# Patient Record
Sex: FEMALE | Race: ASIAN
[De-identification: names, ages, dates, MRNs, and addresses within clinical notes are randomized per-mention and may not be internally consistent; named-entity substitution may affect disease eponyms.]

---

## 2022-04-24 ENCOUNTER — HOSPITAL ENCOUNTER (EMERGENCY)
Dept: HOSPITAL 74 - FER | Age: 42
Discharge: HOME | End: 2022-04-24
Payer: COMMERCIAL

## 2022-04-24 VITALS — HEART RATE: 62 BPM | TEMPERATURE: 97.8 F | DIASTOLIC BLOOD PRESSURE: 60 MMHG | SYSTOLIC BLOOD PRESSURE: 115 MMHG

## 2022-04-24 VITALS — BODY MASS INDEX: 21.5 KG/M2

## 2022-04-24 DIAGNOSIS — M62.838: Primary | ICD-10-CM

## 2022-04-24 PROCEDURE — 3E023GC INTRODUCTION OF OTHER THERAPEUTIC SUBSTANCE INTO MUSCLE, PERCUTANEOUS APPROACH: ICD-10-PCS

## 2023-02-08 ENCOUNTER — HOSPITAL ENCOUNTER (OUTPATIENT)
Dept: HOSPITAL 74 - FASU | Age: 43
Discharge: HOME | End: 2023-02-08
Attending: ORTHOPAEDIC SURGERY
Payer: COMMERCIAL

## 2023-02-08 VITALS — TEMPERATURE: 97.6 F | DIASTOLIC BLOOD PRESSURE: 61 MMHG | RESPIRATION RATE: 16 BRPM | SYSTOLIC BLOOD PRESSURE: 103 MMHG

## 2023-02-08 VITALS — HEART RATE: 70 BPM

## 2023-02-08 VITALS — BODY MASS INDEX: 21.4 KG/M2

## 2023-02-08 DIAGNOSIS — G56.02: Primary | ICD-10-CM

## 2023-02-08 PROCEDURE — 01N50ZZ RELEASE MEDIAN NERVE, OPEN APPROACH: ICD-10-PCS | Performed by: ORTHOPAEDIC SURGERY

## 2023-12-20 ENCOUNTER — HOSPITAL ENCOUNTER (OUTPATIENT)
Dept: HOSPITAL 74 - JMAMMO-SUR | Age: 43
Discharge: HOME | End: 2023-12-20
Attending: OBSTETRICS & GYNECOLOGY
Payer: COMMERCIAL

## 2023-12-20 DIAGNOSIS — C50.811: Primary | ICD-10-CM

## 2023-12-20 DIAGNOSIS — C77.3: ICD-10-CM

## 2023-12-20 PROCEDURE — 0H9T3ZX DRAINAGE OF RIGHT BREAST, PERCUTANEOUS APPROACH, DIAGNOSTIC: ICD-10-PCS

## 2023-12-20 PROCEDURE — A4648 IMPLANTABLE TISSUE MARKER: HCPCS

## 2023-12-26 PROBLEM — Z00.00 ENCOUNTER FOR PREVENTIVE HEALTH EXAMINATION: Status: ACTIVE | Noted: 2023-12-26

## 2023-12-27 ENCOUNTER — APPOINTMENT (OUTPATIENT)
Dept: HEMATOLOGY ONCOLOGY | Facility: CLINIC | Age: 43
End: 2023-12-27
Payer: COMMERCIAL

## 2023-12-27 ENCOUNTER — RESULT REVIEW (OUTPATIENT)
Age: 43
End: 2023-12-27

## 2023-12-27 VITALS
RESPIRATION RATE: 16 BRPM | OXYGEN SATURATION: 100 % | HEART RATE: 80 BPM | BODY MASS INDEX: 23.21 KG/M2 | SYSTOLIC BLOOD PRESSURE: 115 MMHG | HEIGHT: 63 IN | DIASTOLIC BLOOD PRESSURE: 64 MMHG | WEIGHT: 131 LBS | TEMPERATURE: 97.1 F

## 2023-12-27 DIAGNOSIS — Z83.3 FAMILY HISTORY OF DIABETES MELLITUS: ICD-10-CM

## 2023-12-27 DIAGNOSIS — Z82.49 FAMILY HISTORY OF ISCHEMIC HEART DISEASE AND OTHER DISEASES OF THE CIRCULATORY SYSTEM: ICD-10-CM

## 2023-12-27 DIAGNOSIS — Z78.9 OTHER SPECIFIED HEALTH STATUS: ICD-10-CM

## 2023-12-27 PROCEDURE — 99205 OFFICE O/P NEW HI 60 MIN: CPT | Mod: 25

## 2023-12-27 PROCEDURE — 36415 COLL VENOUS BLD VENIPUNCTURE: CPT

## 2023-12-27 PROCEDURE — 99417 PROLNG OP E/M EACH 15 MIN: CPT | Mod: 25

## 2023-12-27 RX ORDER — GABAPENTIN 100 MG/1
100 CAPSULE ORAL
Refills: 0 | Status: COMPLETED | COMMUNITY
End: 2023-12-27

## 2023-12-28 NOTE — HISTORY OF PRESENT ILLNESS
Refill is denied. Pt has not been seen in a year and will not receive refill until appt    ----- Message from Isha Frost sent at 12/28/2018 12:17 PM EST -----  SCRIPT NEEDS TO BE SENT TO [facility] IN [place]  PHONE [phone] FOR THE VITAMIN D    PATIENT WONT BE COMING BACK TO [place] TILL MARCH 5TH    PLEASE SEND SCRIPT TO [facility] IN [facility]     [de-identified] : Arely Luu is a 43 year old female being referred by Dr Schmitz for right breast cancer.   LMP 23 Last mammo 2023 Impression: 1. Bilateral 3-D Screening Mammogram Dense breast parenchyma limits evaluation Highly suspicious irregular mass with spiculation in the 12:00 position of the right breast mid depth with suspicious grouped calcifications noted in area, corresponding to an irregular solid mass highly suspicious mass or breast ultrasound. This correspond to the palpable abnormality that the patient reports. Recommend ultrasound=guided biopsy. BIRADS Category 5 - highly suspicious 2. BILATERAL BREAT ULTRASOUND Palpable right breast 12:00 position mass is seen as irregular taller than wide hypoechoic 1.9 x2.5vm solid mass with microcalcifiations within. Highly suspicious for malignancy. Recommend ultrasound-guided biopsy. Right breast 3:00 retroareolar 0.9x0.7x0.5cm mass. Recommend ultrasound guided biopsy Right breast 7:30 position 11cm from nipple 1.1 x1.2x0.6cm slightly lobulated mass. Recommend ultrasound guided biopsy Right axilla 1.8x1.1x1.9cm prominent lymph node demonstrating a thickened cortex. Recommend ultrasound guided biopsy BI-RADS Category 5- high suspicious, biopsy recommended  CNY; Never Contraception: Withdrawal method Hx Abnormal Pap: denies  G5 3 SABx2,  x3

## 2023-12-28 NOTE — PHYSICAL EXAM
[Fully active, able to carry on all pre-disease performance without restriction] : Status 0 - Fully active, able to carry on all pre-disease performance without restriction [Normal] : affect appropriate [de-identified] : ~4 cm x 3.5 cm firm mobile palpable mass, right breast 12:00 position. Palpable right axillary node around the biopsy site. [de-identified] : Mild pain around the prior spine surgery site.

## 2023-12-28 NOTE — ASSESSMENT
[FreeTextEntry1] : Right breast  IDC Grade 3, 2.9 cm per USG ER (95%) positive, LA (5%) positive, Tfq5iff negative by IHC KI 67- 60% First felt end of 2023. Unchanged No new bone pains, weight loss Age at Menarche: 11 years Age at Menopause: LMP 22 Age at first pregnancy: 25 years Total number of pregnancies: 3 children.  Breast feedinm, 12m, 12m OC pills Never Invitae genetic testing done at [facility], Results pending  Records from [facility] including imaging studies, pathology, procedure/OR notes reviewed analyzed and discussed Discussed at length about the diagnosis, work up, staging, prognosis and treatment options Discussed about the role of local therapy (surgery and radiation) and systemic therapy (chemotherapy, endocrine therapy, Xxq5xeh directed therapy) SHe has a poorly diff ER positive nad Her2 negative disease with high KI67 60% We need staging work up for which I recommend PET/CT. We also need to wait for the results of MRI which was done yesterday, results pending Depending on the MRI and PET results, as long as she does not have metastatic disease, she will get surgery, endocrine therapy nad possibly chemotherapy She has appt with Dr Schmitz (Breast surgery) next week. If it will benefit from surgery stand point, we may consider neoadjuvant chemotherapy. Otherwise, we can look at the final surgical path and send genomic assay such as Oncotype or Mammaprint if she has 1-3 positive LNs to determine chemotherapy We can also consider sending genomic assay from the biopsy once we have the specimen retrieved from [facility] Once we have all the imaging, her treatment plan will be coordinated between Dr Schmitz and myself Plan: Send blood work including CBC, CMP, [id number], FSH, Estradiol, Pola CTDNA PET/CT for staging MRI breast- Obtain results. CD sent to Radiology for scanning Obtain pathology slides from [facility] Enedelia discussed about lifestyle changes including exercise and whole food plant based diet  Social hx:  Lives in [place], [place] with   (Urbano) Worked [employer] Originally from [place].  Never smoker  Patient,  (Urbano) and sister in law (Talia) had multiple questions which were answered to satisfaction  They have scheduled a couple of second opinions and will decide subsequently where they want to pursue care  Follow up will be coordinated after she is done seeing Dr Schmitz Family Hx F GM: breast cancer

## 2024-01-03 ENCOUNTER — NON-APPOINTMENT (OUTPATIENT)
Age: 44
End: 2024-01-03

## 2024-01-03 ENCOUNTER — APPOINTMENT (OUTPATIENT)
Dept: BREAST CENTER | Facility: CLINIC | Age: 44
End: 2024-01-03
Payer: COMMERCIAL

## 2024-01-03 VITALS
HEIGHT: 63 IN | BODY MASS INDEX: 23.57 KG/M2 | DIASTOLIC BLOOD PRESSURE: 57 MMHG | SYSTOLIC BLOOD PRESSURE: 114 MMHG | HEART RATE: 76 BPM | WEIGHT: 133 LBS

## 2024-01-03 DIAGNOSIS — Z86.69 PERSONAL HISTORY OF OTHER DISEASES OF THE NERVOUS SYSTEM AND SENSE ORGANS: ICD-10-CM

## 2024-01-03 DIAGNOSIS — Z87.39 PERSONAL HISTORY OF OTHER DISEASES OF THE MUSCULOSKELETAL SYSTEM AND CONNECTIVE TISSUE: ICD-10-CM

## 2024-01-03 PROCEDURE — 99205 OFFICE O/P NEW HI 60 MIN: CPT | Mod: 25

## 2024-01-03 PROCEDURE — 93702 BIS XTRACELL FLUID ANALYSIS: CPT | Mod: NC

## 2024-01-03 NOTE — PHYSICAL EXAM
[Normocephalic] : normocephalic [Atraumatic] : atraumatic [Supple] : supple [No Supraclavicular Adenopathy] : no supraclavicular adenopathy [Examined in the supine and seated position] : examined in the supine and seated position [Symmetrical] : symmetrical [No dominant masses] : no dominant masses left breast [No Nipple Retraction] : no left nipple retraction [No Nipple Discharge] : no left nipple discharge [No Axillary Lymphadenopathy] : no left axillary lymphadenopathy [No Edema] : no edema [No Rashes] : no rashes [No Ulceration] : no ulceration [de-identified] : s/p wise pattern bilaterally, NAC viable [de-identified] : 12:00 about 4cm mass underlying NAC, very superficial [de-identified] : palp node, c/w known mets

## 2024-01-03 NOTE — CONSULT LETTER
[Dear  ___] : Dear  [unfilled], [( Thank you for referring [unfilled] for consultation for _____ )] : Thank you for referring [unfilled] for consultation for [unfilled] [Please see my note below.] : Please see my note below. [Consult Closing:] : Thank you very much for allowing me to participate in the care of this patient.  If you have any questions, please do not hesitate to contact me. [Sincerely,] : Sincerely, [FreeTextEntry3] : Jelena Schmitz [place] [address] [DrPam  ___] : Dr. CARDENAS

## 2024-01-03 NOTE — HISTORY OF PRESENT ILLNESS
[FreeTextEntry1] : Arely is a 43 year old female referred by Dr. 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  She noted a painful right breast mass while she was out of the country in [facility]. At that time an ultrasound was ordered and revealed a 2.6 x 3.6 cm right breast mass. Upon return she consulted with her GYN Dr Tam (12/5/23) at which time diagnostic imaging was ordered.  Bilateral diagnostic mammogram (12/14/2023, [facility]) showed extremely dense breast tissue and a right 12:00 asymmetry and distortion in area of palpable concern.  Ultrasound findings showed a right 12:00 2.7 x 2.9 cm irregular hypoechoic lesion with internal calcifications correlating to mammogram findings. Additional ultrasound findings are a right 3:00 reto 0.9 x 0.7 x 0.5 cm irregular hypoechoic lesion and a right 7:30 N11 1.1 x 1.2 x 0.6 cm lobulated hypodense mass. Ultrasound biopsy of all three breast masses were recommended. A prominent right axillary lymph node measuring 1.8 1.1 x 1.9 cm with a thickened cortex suspicious for metastatic disease was also seen and ultrasound biopsy recommended. No suspicious findings on mammogram of the left breast was noted. Ultrasound was unilateral.  Arely underwent the recommended biopsies on 12/20/2023 ([facility]) Pathology findings: Right 12:00  (Coil-shaped clip)IDC, poorly differentiated, ER strongly positive in 95%, VT moderately positive in 5%, Her2 negative, and Ki67 60% Right axilla lymph node (hydro-marking clip) containing foci of metastatic carcinoma (receptors not repeated on lymph node tissue) Right 3:00 9ribbon-shaped clip) fibroadenoma Right 7:30 (wing-shaped clip)fibroadenoma  She again consulted with Dr Tam after pathology was available and a bilateral breast MRI was ordered as well as an [employer] genetics panel.   She presents with her  and sister-in-law for consultation. She has been in the [place] 2 [place] and used to work for the [employer]. She had two breast reductions in [facility] one in 2001 and the second in 2017.  She does SBE.  She has not noticed a change in her breast or a breast lump on left. On right mass has gotten bigger since biopsy. She has not noticed a change in her nipple or nipple area. She has not noticed a change in the skin of the breast. except post bx changes on right She is not experiencing nipple discharge. She is experiencing breast pain. Bilateral  She has noticed a lump or lymph node under the armpit on the right only.  BREAST CANCER RISK FACTORS Menarche: 11 Date of LMP: 12/19/2023 Menopause: pre Grav: 5     Para:  3 (17, 13, 1 yo sons) Age at first live birth: 25 Nursed: yes Hysterectomy: no Oophorectomy: no  OCP: no HRT: no  Last pap/pelvic exam: 12/2023 WNL Related family history: pat GM BCA@ 50 Ashkenazi: no Mastery risk assessment: n/a BRCA testing: pending Invitae sent 12/27/2023 (Dr. Tam) Bra size:  34 DD  Last mammogram: 12/14 2023                              Location: [facility] Report reviewed.                                 Images reviewed. Results: Birads 5 Extremely dense breast tissue. Right 12:00 asymmetry and distortion in area of palpable concern.    Last ultrasound: 12/14/2023                                  Location: [facility] Report reviewed.                                 Images reviewed.  Results: Birads 5 Right 12:00 2.7 x 2.9 cm irregular hypoechoic lesion with internal calcifications correlating to mammogram findings.  Right 3:00 retro 0.9 x 0.7 x 0.5 cm irregular hypoechoic lesion. Right 7:30 N11 1.1 x 1.2 x 0.6 cm lobulated hypodense mass.  Right axilla prominent right axillary lymph node measuring 1.8 1.1 x 1.9 cm with a thickened cortex suspicious for metastatic disease. Rec: Ultrasound biopsy of all four findings.  Last MRI:  12/26/2023                                           Location:  [facility] Report reviewed. Results: Report pending

## 2024-01-03 NOTE — REVIEW OF SYSTEMS
[Feeling Tired] : feeling tired [Eyesight Problems] : eyesight problems [Sore Throat] : sore throat [Arthralgias] : arthralgias [Hand Pain] : hand pain [Hand Stiffness] : stiffness of the hand [Lower Back Pain] : lower back pain [Upper Back Pain] : upper back pain [Breast Pain] : breast pain [Breast Lump] : breast lump [Breast Swelling] : breast swelling [Breast Warmth] : breast warmth [Breast Itching] : breast itching [Negative] : Heme/Lymph

## 2024-01-03 NOTE — ASSESSMENT
[FreeTextEntry1] : The pathology and imaging results were reviewed and discussed. She is a stage IIB right breast cancer (T2N1M0) HER2 negative ER+/SC+ (AJCC 8thed).  Breast MRI report was reviewed.  The use of multimodality therapy for the treatment of breast cancer was discussed.   Surgical options were reviewed including a lumpectomy to negative margins, with whole breast radiation therapy versus a mastectomy with or without reconstruction. Included in this discussion with the results of the NSABP-04 and 06 trials.  Mastectomy techniques were discussed in detail including skin sparing and nipple sparing techniques. Recurrence was reviewed and that mastectomy does not confer a 100% risk reduction nor guarantee against breast cancer in the future.  Reconstructive options were briefly reviewed, including implant based versus tissue flap based reconstruction options.  Referral to a plastic surgeon was offered.  The patient was informed that breast reconstruction is covered by insurance per state and federal laws.     Axillary staging was discussed. We reviewed the sentinel lymph node procedure, and how if the sentinel lymph node is found to have metastatic disease either on the intraoperative evaluation or on the final pathology, that an axillary dissection of the remaining lymph nodes may be recommended. It was explained that an axillary dissection takes "level one and level two" lymph nodes, and also "level three" if they feel clinically suspicious. It was explained that if the sentinel lymph node was not able to be found, that an axillary dissection may be necessary.  I reviewed the risks of lymphedema for SLND (6%) versus ALND (20%). I reviewed our lymphedema monitoring program. Baseline SOZO was completed.  The Z-0011 study was touched upon, outlining that there is evidence that it may not be necessary to complete an axillary dissection in select patients even if one or two sentinel nodes are positive for cancer, as long as the cancer in the nodes is confined to within the nodes, the nodes aren't  matted down, and if the cancer meets certain criteria including being less than 5 cm, treated by lumpectomy and conventional whole breast radiation, and the patient is planning to take recommended adjuvant therapy, and didn't require preoperative chemotherapy.     The general indications for the use of adjuvant hormonal and chemotherapy and targeted therapies were discussed. It was explained that medical treatments are dependent on pathology results including receptor studies.  The patient was advised to meet with a medical oncologist since she is node positive. It was explained that some patients have further genetic testing of their tumors before a decision about chemotherapy can be made.  The indications for radiation therapy and side effects were also discussed including the use varying lengths of whole breast radiation.  It was explained that radiation is generally reserved for lumpectomy patients, and patients with larger tumors or positive lymph nodes. Common side effects were reviewed. She is not a candidate for intraoperative radiation therapy.  The genetics of breast cancer were discussed with the implications for risk of contralateral cancer and other associated cancers, implication for ovarian risk, options for management, and implications for family members. She is awaiting genetic testing results.  She met with our cancer navigator and was given a cancer binder.   I believe I was able to answer all of her questions to her satisfaction. She is scheduled for her PET scan tomorrow. Discussed with Dr. Judd that neoadjuvant chemotherapy is being recommended. I think this is reasonable so that she can start her systemic therapy asap. We discussed mastectomy and post mastectomy radiation. She is strongly motivated towards autologous reconstruction.

## 2024-01-04 ENCOUNTER — RESULT REVIEW (OUTPATIENT)
Age: 44
End: 2024-01-04

## 2024-01-05 ENCOUNTER — APPOINTMENT (OUTPATIENT)
Dept: HEMATOLOGY ONCOLOGY | Facility: CLINIC | Age: 44
End: 2024-01-05
Payer: COMMERCIAL

## 2024-01-05 VITALS
HEIGHT: 63 IN | WEIGHT: 131.5 LBS | BODY MASS INDEX: 23.3 KG/M2 | DIASTOLIC BLOOD PRESSURE: 67 MMHG | RESPIRATION RATE: 16 BRPM | HEART RATE: 82 BPM | OXYGEN SATURATION: 100 % | TEMPERATURE: 98 F | SYSTOLIC BLOOD PRESSURE: 112 MMHG

## 2024-01-05 DIAGNOSIS — Z71.3 DIETARY COUNSELING AND SURVEILLANCE: ICD-10-CM

## 2024-01-05 PROCEDURE — 99417 PROLNG OP E/M EACH 15 MIN: CPT

## 2024-01-05 PROCEDURE — 99215 OFFICE O/P EST HI 40 MIN: CPT

## 2024-01-08 ENCOUNTER — RESULT REVIEW (OUTPATIENT)
Age: 44
End: 2024-01-08

## 2024-01-08 NOTE — HISTORY OF PRESENT ILLNESS
[de-identified] : Arely Luu is a 43 year old female being referred by Dr Schmitz for right breast cancer.   LMP 23 Last mammo 2023 Impression: 1. Bilateral 3-D Screening Mammogram Dense breast parenchyma limits evaluation Highly suspicious irregular mass with spiculation in the 12:00 position of the right breast mid depth with suspicious grouped calcifications noted in area, corresponding to an irregular solid mass highly suspicious mass or breast ultrasound. This correspond to the palpable abnormality that the patient reports. Recommend ultrasound=guided biopsy. BIRADS Category 5 - highly suspicious 2. BILATERAL BREAT ULTRASOUND Palpable right breast 12:00 position mass is seen as irregular taller than wide hypoechoic 1.9 x2.5vm solid mass with microcalcifiations within. Highly suspicious for malignancy. Recommend ultrasound-guided biopsy. Right breast 3:00 retroareolar 0.9x0.7x0.5cm mass. Recommend ultrasound guided biopsy Right breast 7:30 position 11cm from nipple 1.1 x1.2x0.6cm slightly lobulated mass. Recommend ultrasound guided biopsy Right axilla 1.8x1.1x1.9cm prominent lymph node demonstrating a thickened cortex. Recommend ultrasound guided biopsy BI-RADS Category 5- high suspicious, biopsy recommended  CNY; Never Contraception: Withdrawal method Hx Abnormal Pap: denies  G5 3 SABx2,  x3   No new bone pains, weight loss Age at Menarche: 11 years Age at Menopause: LMP 22 Age at first pregnancy: 25 years Total number of pregnancies: 3 children.  Breast feedinm, 12m, 12m OC pills Never  [de-identified] : Patient is seen today for follow up Accompanied by her  (Urbano)  No new symptoms Saw Dr Jelena Schmitz (Breast surgery) and had PET/CT yesterday (1/4/24)  PET/CT (1/4/23) Reviewed and discussed with radiology (Dr Riky Spencer) She has FDG avid right breast mass with multiple lymph node involvement including right axillary, infrapectoral and right supraclavicular node. No distant metastases

## 2024-01-08 NOTE — PHYSICAL EXAM
[Fully active, able to carry on all pre-disease performance without restriction] : Status 0 - Fully active, able to carry on all pre-disease performance without restriction [Normal] : affect appropriate [de-identified] : ~4 cm x 3.5 cm firm mobile palpable mass, right breast 12:00 position. Palpable right axillary node around the biopsy site. [de-identified] : Mild pain around the prior spine surgery site.

## 2024-01-08 NOTE — ASSESSMENT
[FreeTextEntry1] : Right breast  IDC Grade 3, 2.9 cm per USG ER (95%) positive, GA (5%) positive, Ebz8iqp negative by IHC KI 67- 60% First felt end of July 2023. Unchanged Invitae genetic testing done at [facility], Results pending MRI (12/26/23) at [facility]-: 4.8 cm x 3.1 cm right breast 12:00 mass, extension into medial half of right breast. Right axillary LN marking clip. Left breast normal PET/CT (1/4/23): FDG avid right breast mass with multiple lymph node involvement including right axillary, infrapectoral and right supraclavicular node. No distant metastases Clinical pathological stage IIIB ([id number]) if the supraclavicular node is positive for mets  Discussed at length about the diagnosis, work up, staging, prognosis and treatment options Discussed about the role of systemic chemotherapy, endocrine therapy, surgery, radiation Given the extensive disease, she is not a good candidate for upfront surgery. Discussed with Dr Schmitz.  She will benefit from neoadjvuant chemotherapy which will also work via ovarian suppression. She and her  are not interested in fertility preservation I have reviewed the risks, benefits and side effects of chemotherapy with the patient. Options of ddAC->T vs TC discussed. Explained about nausea, vomiting, diarrhea/constipation, hair loss, fatigue, mouth sores, infections, bleeding, kidney/liver damage, neuropathy, etc. Also discussed about the risk of cardiac toxicity (arrhythmia, heart failure) and leukemia with adriamycin. All questions were answered to satisfaction. Patient agrees to pursue the planned chemotherapy. She opts for AC->T  Discussed about cold caps. She is leaning against it and will call me if she changes her mind Plan: Adriamycin 60 mg/m2 + Cyclophosphamide 600 mg/m2 every 2 weeks with neulasta x 4 followed by paclitaxel 80 mg/m2 weekly x 12 We have requested ER, GA, Umq2vxf to be repeated on the LNs at [facility]. Follow results  Obtain pathology slides from [facility] Echocardiogram Chemoport placement Biopsy Right supraclavicular LN to confirm involvement Follow Pola  Lifestyle modification Discussed at length about the diet especially trying whole food plant-based diet for 1 to 3 months. Patient can continue longer if desired. Available evidence about the benefits reviewed and discussed with the patient.  Resources in the form of books, websites, documentaries given to the patient in writing. Advised to take B12 1000 mcg QD if she chooses to switch to plant based diet. Patient is motivated to try the diet.  Social hx:  Lives in [place], [place] with   (Urbano) Worked [employer] Originally from [place].  Never smoker  Family Hx F GM: breast cancer Invitae sent at [facility]. Follow results  Patient,  (Urbano) had multiple questions which were answered to satisfaction  Follow up in 1 week for chemo Can use first set of labs

## 2024-01-09 ENCOUNTER — RESULT REVIEW (OUTPATIENT)
Age: 44
End: 2024-01-09

## 2024-01-09 ENCOUNTER — NON-APPOINTMENT (OUTPATIENT)
Age: 44
End: 2024-01-09

## 2024-01-09 DIAGNOSIS — J02.9 ACUTE PHARYNGITIS, UNSPECIFIED: ICD-10-CM

## 2024-01-11 ENCOUNTER — NON-APPOINTMENT (OUTPATIENT)
Age: 44
End: 2024-01-11

## 2024-01-12 ENCOUNTER — APPOINTMENT (OUTPATIENT)
Dept: HEMATOLOGY ONCOLOGY | Facility: CLINIC | Age: 44
End: 2024-01-12
Payer: COMMERCIAL

## 2024-01-12 VITALS
RESPIRATION RATE: 16 BRPM | SYSTOLIC BLOOD PRESSURE: 108 MMHG | OXYGEN SATURATION: 99 % | HEART RATE: 101 BPM | WEIGHT: 132.06 LBS | BODY MASS INDEX: 23.4 KG/M2 | TEMPERATURE: 98.4 F | DIASTOLIC BLOOD PRESSURE: 62 MMHG | HEIGHT: 63 IN

## 2024-01-12 DIAGNOSIS — Z95.828 PRESENCE OF OTHER VASCULAR IMPLANTS AND GRAFTS: ICD-10-CM

## 2024-01-12 PROCEDURE — 99215 OFFICE O/P EST HI 40 MIN: CPT

## 2024-01-12 RX ORDER — ONDANSETRON 4 MG/1
4 TABLET ORAL
Qty: 30 | Refills: 6 | Status: ACTIVE | COMMUNITY
Start: 2024-01-12 | End: 1900-01-01

## 2024-01-12 NOTE — PHYSICAL EXAM
[Fully active, able to carry on all pre-disease performance without restriction] : Status 0 - Fully active, able to carry on all pre-disease performance without restriction [Normal] : affect appropriate [de-identified] : ~4 cm x 3.5 cm firm mobile palpable mass, right breast 12:00 position. Palpable right axillary node around the biopsy site. [de-identified] : Mild pain around the prior spine surgery site.

## 2024-01-12 NOTE — ASSESSMENT
[FreeTextEntry1] : Right breast  IDC Grade 3, 2.9 cm per USG ER (95%) positive, MI (5%) positive, Eto0zyt negative by IHC, KI 67- 60% Right Supraclavicular LN: Psz8qfv 3+ Right axillary node: Bzh0bgf positive by FISH First felt end of July 2023. Unchanged Invitae genetic testing done at [facility], Results pending MRI (12/26/23) at [facility]-: 4.8 cm x 3.1 cm right breast 12:00 mass, extension into medial half of right breast. Right axillary LN marking clip. Left breast normal PET/CT (1/4/23): FDG avid right breast mass with multiple lymph node involvement including right axillary, infrapectoral and right supraclavicular node. No distant metastases Clinical pathological stage IIIB ([id number]) if the supraclavicular node is positive for mets Echo LVEF 62%  She is seen today for follow-up and begin cycle 1 ddAC s/p biopsy of right supraclavicular lymph node 1/9/2024 Path: Metastatic carcinoma consistent with breast primary ER>95% strong positive. MI up to 10% moderate to strong positive. Her2: Positive (Score: 3+). Discussed at length about tumor heterogeneity, and that her lymph node is actually HER2 positive.Implications discussed Will add HP to weekly Taxol.  I have reviewed the risks, benefits and side effects of Herceptin and perjeta including diarrhea, rash, hand foot syndrome, cardiac toxicity etc All questions were answered to satisfaction. Patient agrees to pursue the planned chemotherapy. Plan: Adriamycin 60 mg/m2 + Cyclophosphamide 600 mg/m2 every 2 weeks with neulasta x 4 followed by paclitaxel 80 mg/m2 weekly x 12 along with herceptin weekly (4mg/kg loading followed by 2mg/kg weekly) and perjeta (840 mg loading followed by 420 mg weekly) Q3W MRI brain Follow Pola  Iron deficiency Anemia Discussed at length about iron deficiency- etiology, signs and symptoms, complications, management options DIscussed about oral vs IV Iron I have reviewed the risks, benefits and side effects of IV iron with the patient. All questions were answered to satisfaction. Patient agrees to pursue IV iron. Schedule IV venofer 200 mg  x 5. Can given it with her chemo treatments  Social hx:  Lives in [place], [place] with   (Urbano) Worked [employer] Originally from [place].  Never smoker  Family Hx F GM: breast cancer Invitae sent at [facility]. Follow results  Patient,  (Urbano) had multiple questions which were answered to satisfaction  Follow up in 1 week for chemo Can use first set of labs

## 2024-01-12 NOTE — HISTORY OF PRESENT ILLNESS
[de-identified] : Arely Luu is a 43 year old female being referred by Dr Schmitz for right breast cancer.   LMP 23 Last mammo 2023 Impression: 1. Bilateral 3-D Screening Mammogram Dense breast parenchyma limits evaluation Highly suspicious irregular mass with spiculation in the 12:00 position of the right breast mid depth with suspicious grouped calcifications noted in area, corresponding to an irregular solid mass highly suspicious mass or breast ultrasound. This correspond to the palpable abnormality that the patient reports. Recommend ultrasound=guided biopsy. BIRADS Category 5 - highly suspicious 2. BILATERAL BREAT ULTRASOUND Palpable right breast 12:00 position mass is seen as irregular taller than wide hypoechoic 1.9 x2.5vm solid mass with microcalcifiations within. Highly suspicious for malignancy. Recommend ultrasound-guided biopsy. Right breast 3:00 retroareolar 0.9x0.7x0.5cm mass. Recommend ultrasound guided biopsy Right breast 7:30 position 11cm from nipple 1.1 x1.2x0.6cm slightly lobulated mass. Recommend ultrasound guided biopsy Right axilla 1.8x1.1x1.9cm prominent lymph node demonstrating a thickened cortex. Recommend ultrasound guided biopsy BI-RADS Category 5- high suspicious, biopsy recommended  CNY; Never Contraception: Withdrawal method Hx Abnormal Pap: denies  G5 3 SABx2,  x3   No new bone pains, weight loss Age at Menarche: 11 years Age at Menopause: LMP 22 Age at first pregnancy: 25 years Total number of pregnancies: 3 children.  Breast feedinm, 12m, 12m OC pills Never  Saw Dr Jelena Schmitz (Breast surgery) and had PET/CT (24)  PET/CT (23) Reviewed and discussed with radiology (Dr Riky Spencer) She has FDG avid right breast mass with multiple lymph node involvement including right axillary, infrapectoral and right supraclavicular node. No distant metastases  [de-identified] : Patient is seen today for follow up and begin C1 ddAC (1/12/24) Accompanied by her  (Urbano)  No new symptoms She is status post Chemo-Port placement, echocardiogram s/p biopsy of right supraclavicular lymph node 1/9/2024 Path: Metastatic carcinoma consistent with breast primary ER>95% strong positive. [place] up to 10% moderate to strong positive. Her2: Positive (Score: 3+).

## 2024-01-15 ENCOUNTER — RESULT REVIEW (OUTPATIENT)
Age: 44
End: 2024-01-15

## 2024-01-19 ENCOUNTER — APPOINTMENT (OUTPATIENT)
Dept: HEMATOLOGY ONCOLOGY | Facility: CLINIC | Age: 44
End: 2024-01-19
Payer: COMMERCIAL

## 2024-01-19 ENCOUNTER — RESULT REVIEW (OUTPATIENT)
Age: 44
End: 2024-01-19

## 2024-01-19 VITALS
OXYGEN SATURATION: 99 % | BODY MASS INDEX: 22.68 KG/M2 | SYSTOLIC BLOOD PRESSURE: 105 MMHG | DIASTOLIC BLOOD PRESSURE: 57 MMHG | WEIGHT: 128 LBS | HEART RATE: 100 BPM | TEMPERATURE: 99.5 F | HEIGHT: 63 IN | RESPIRATION RATE: 16 BRPM

## 2024-01-19 DIAGNOSIS — K21.9 GASTRO-ESOPHAGEAL REFLUX DISEASE W/OUT ESOPHAGITIS: ICD-10-CM

## 2024-01-19 PROCEDURE — 36415 COLL VENOUS BLD VENIPUNCTURE: CPT

## 2024-01-19 PROCEDURE — 99215 OFFICE O/P EST HI 40 MIN: CPT

## 2024-01-19 NOTE — ASSESSMENT
[FreeTextEntry1] : Right breast  IDC Grade 3, 2.9 cm per USG ER (95%) positive, MA (5%) positive, Cdb1bhv negative by IHC, KI 67- 60% Right Supraclavicular LN: Mvv1dkt 3+ Right axillary node: Ptw2myx positive by FISH First felt end of July 2023. Unchanged [facility] genetic testing done at [facility], Results pending MRI (12/26/23) at [facility]-: 4.8 cm x 3.1 cm right breast 12:00 mass, extension into medial half of right breast. Right axillary LN marking clip. Left breast normal PET/CT (1/4/23): FDG avid right breast mass with multiple lymph node involvement including right axillary, infrapectoral and right supraclavicular node. No distant metastases Clinical pathological stage IIIB ([id number]) if the supraclavicular node is positive for mets Echo LVEF 62% s/p biopsy of right supraclavicular lymph node 1/9/2024 Path: Metastatic carcinoma consistent with breast primary ER>95% strong positive. MA up to 10% moderate to strong positive. Her2: Positive (Score: 3+).  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 Monitor for fever and go to ER PRN Obtain MRI brain Will give Olanzapine with C2 and increase dex to 3 days with next chemo Take pantaprozole with dex  Plan: Adriamycin 60 mg/m2 + Cyclophosphamide 600 mg/m2 every 2 weeks with neulasta x 4 followed by paclitaxel 80 mg/m2 weekly x 12 along with herceptin weekly (4mg/kg loading followed by 2mg/kg weekly) and perjeta (840 mg loading followed by 420 mg weekly) Q3W MRI brain Follow [facility]  Iron deficiency Anemia IV venofer 200 mg  x 5 with her chemo treatments  Social hx:  Lives in [place], [place] with   (Urbano) Worked [employer] Originally from [place].  Never smoker  Family Hx F GM: breast cancer [facility] sent at [facility]. Follow results  Patient,  (Urbano) had multiple questions which were answered to satisfaction  Follow up in 1 week C2 CBC, CMP

## 2024-01-19 NOTE — HISTORY OF PRESENT ILLNESS
[de-identified] : Arely Luu is a 43 year old female being referred by Dr Schmitz for right breast cancer.   LMP 23 Last mammo 2023 Impression: 1. Bilateral 3-D Screening Mammogram Dense breast parenchyma limits evaluation Highly suspicious irregular mass with spiculation in the 12:00 position of the right breast mid depth with suspicious grouped calcifications noted in area, corresponding to an irregular solid mass highly suspicious mass or breast ultrasound. This correspond to the palpable abnormality that the patient reports. Recommend ultrasound=guided biopsy. BIRADS Category 5 - highly suspicious 2. BILATERAL BREAT ULTRASOUND Palpable right breast 12:00 position mass is seen as irregular taller than wide hypoechoic 1.9 x2.5vm solid mass with microcalcifiations within. Highly suspicious for malignancy. Recommend ultrasound-guided biopsy. Right breast 3:00 retroareolar 0.9x0.7x0.5cm mass. Recommend ultrasound guided biopsy Right breast 7:30 position 11cm from nipple 1.1 x1.2x0.6cm slightly lobulated mass. Recommend ultrasound guided biopsy Right axilla 1.8x1.1x1.9cm prominent lymph node demonstrating a thickened cortex. Recommend ultrasound guided biopsy BI-RADS Category 5- high suspicious, biopsy recommended  CNY; Never Contraception: Withdrawal method Hx Abnormal Pap: denies  G5 3 SABx2,  x3   No new bone pains, weight loss Age at Menarche: 11 years Age at Menopause: LMP 22 Age at first pregnancy: 25 years Total number of pregnancies: 3 children.  Breast feedinm, 12m, 12m OC pills Never  Saw Dr Jelena Schmitz (Breast surgery) and had PET/CT (24)  PET/CT (23) Reviewed and discussed with radiology (Dr Riky Spencer) She has FDG avid right breast mass with multiple lymph node involvement including right axillary, infrapectoral and right supraclavicular node. No distant metastases  She is status post Chemo-Port placement, echocardiogram s/p biopsy of right supraclavicular lymph node 2024 Path: Metastatic carcinoma consistent with breast primary ER>95% strong positive. [place] up to 10% moderate to strong positive. Her2: Positive (Score: 3+). [de-identified] : Patient is seen today for jean claude labs after C1 ddAC (1/12/24) Accompanied by her  (Urbano)  Had difficult time with C1.  Acid reflux No appetite Weakness Also One of her sons was sick with flu

## 2024-01-19 NOTE — PHYSICAL EXAM
[Fully active, able to carry on all pre-disease performance without restriction] : Status 0 - Fully active, able to carry on all pre-disease performance without restriction [Normal] : affect appropriate [de-identified] : ~4 cm x 3.5 cm firm mobile palpable mass, right breast 12:00 position is now softer with faint margins.. Palpable right axillary node around the biopsy site. [de-identified] : Mild pain around the prior spine surgery site.

## 2024-01-20 ENCOUNTER — NON-APPOINTMENT (OUTPATIENT)
Age: 44
End: 2024-01-20

## 2024-01-25 ENCOUNTER — RESULT REVIEW (OUTPATIENT)
Age: 44
End: 2024-01-25

## 2024-01-26 ENCOUNTER — RESULT REVIEW (OUTPATIENT)
Age: 44
End: 2024-01-26

## 2024-01-26 ENCOUNTER — APPOINTMENT (OUTPATIENT)
Dept: HEMATOLOGY ONCOLOGY | Facility: CLINIC | Age: 44
End: 2024-01-26
Payer: COMMERCIAL

## 2024-01-26 VITALS
OXYGEN SATURATION: 100 % | SYSTOLIC BLOOD PRESSURE: 103 MMHG | TEMPERATURE: 96.9 F | DIASTOLIC BLOOD PRESSURE: 67 MMHG | RESPIRATION RATE: 17 BRPM | HEIGHT: 63 IN | HEART RATE: 91 BPM | BODY MASS INDEX: 22.4 KG/M2 | WEIGHT: 126.44 LBS

## 2024-01-26 PROCEDURE — 99214 OFFICE O/P EST MOD 30 MIN: CPT | Mod: 25

## 2024-01-26 PROCEDURE — 36415 COLL VENOUS BLD VENIPUNCTURE: CPT

## 2024-01-26 NOTE — HISTORY OF PRESENT ILLNESS
[de-identified] : Arely Luu is a 43 year old female being referred by Dr Schmitz for right breast cancer.   LMP 23 Last mammo 2023 Impression: 1. Bilateral 3-D Screening Mammogram Dense breast parenchyma limits evaluation Highly suspicious irregular mass with spiculation in the 12:00 position of the right breast mid depth with suspicious grouped calcifications noted in area, corresponding to an irregular solid mass highly suspicious mass or breast ultrasound. This correspond to the palpable abnormality that the patient reports. Recommend ultrasound=guided biopsy. BIRADS Category 5 - highly suspicious 2. BILATERAL BREAT ULTRASOUND Palpable right breast 12:00 position mass is seen as irregular taller than wide hypoechoic 1.9 x2.5vm solid mass with microcalcifiations within. Highly suspicious for malignancy. Recommend ultrasound-guided biopsy. Right breast 3:00 retroareolar 0.9x0.7x0.5cm mass. Recommend ultrasound guided biopsy Right breast 7:30 position 11cm from nipple 1.1 x1.2x0.6cm slightly lobulated mass. Recommend ultrasound guided biopsy Right axilla 1.8x1.1x1.9cm prominent lymph node demonstrating a thickened cortex. Recommend ultrasound guided biopsy BI-RADS Category 5- high suspicious, biopsy recommended  CNY; Never Contraception: Withdrawal method Hx Abnormal Pap: denies  G5 3 SABx2,  x3   No new bone pains, weight loss Age at Menarche: 11 years Age at Menopause: LMP 22 Age at first pregnancy: 25 years Total number of pregnancies: 3 children.  Breast feedinm, 12m, 12m OC pills Never  Saw Dr Jelena Schmitz (Breast surgery) and had PET/CT (24)  PET/CT (23) Reviewed and discussed with radiology (Dr Riky Spencer) She has FDG avid right breast mass with multiple lymph node involvement including right axillary, infrapectoral and right supraclavicular node. No distant metastases  She is status post Chemo-Port placement, echocardiogram s/p biopsy of right supraclavicular lymph node 2024 Path: Metastatic carcinoma consistent with breast primary ER>95% strong positive. [place] up to 10% moderate to strong positive. Her2: Positive (Score: 3+). [de-identified] : Patient is seen today for jean claude labs after C2 ddAC (1/12/24) Accompanied by her  (Urbano)  She went into [place] ED on 1/20/2023 for low grade fever and low back pain - CXR, CT lumbar, and MRI of lumbar were all unremarkable.  She's feeling better since with improved reflux.  As per  and patient, her right breast mass as gotten softer.

## 2024-01-26 NOTE — ASSESSMENT
[FreeTextEntry1] : ## Right breast  IDC Grade 3, 2.9 cm per USG ER (95%) positive, PA (5%) positive, Hvj1hez negative by IHC, KI 67- 60% Right Supraclavicular LN: Lwy4zvq 3+ Right axillary node: Jyn5ird positive by FISH First felt end of July 2023. Unchanged [facility] genetic testing done at [facility], Results pending MRI (12/26/23) at [facility]-: 4.8 cm x 3.1 cm right breast 12:00 mass, extension into medial half of right breast. Right axillary LN marking clip. Left breast normal PET/CT (1/4/23): FDG avid right breast mass with multiple lymph node involvement including right axillary, infrapectoral and right supraclavicular node. No distant metastases Clinical pathological stage IIIB ([id number]) if the supraclavicular node is positive for mets Echo LVEF 62% s/p biopsy of right supraclavicular lymph node 1/9/2024 Path: Metastatic carcinoma consistent with breast primary ER>95% strong positive. PA up to 10% moderate to strong positive. Her2: Positive (Score: 3+). 1/2024 MRI - neg for brain mets.   She is seen today for jean lcaude labs after cycle 2 ddAC (1/12/2024 - present)  Advised to take Claritin/ES Tylenol prior to Neulasta and ES Tylenol prn after for bone pain  To take Decadron 8 mg x 2 days after chemo and Zofran prn nausea  -Labs are drawn in the office, reviewed, analyzed, and discussed Setting patient for hydration next week and she'll call for follow up if any problems arise.  -continue with Pantoprazole for reflux. proceed with treatment.   Plan: Adriamycin 60 mg/m2 + Cyclophosphamide 600 mg/m2 every 2 weeks with neulasta x 4 followed by paclitaxel 80 mg/m2 weekly x 12 along with herceptin weekly (4mg/kg loading followed by 2mg/kg weekly) and perjeta (840 mg loading followed by 420 mg weekly) Q3W MRI brain Follow [facility]  ## Iron deficiency Anemia Ferritin - 19 but with repeat Ferritin 412  Has not had any IV Venofer, will consider giving IV in the future.   Social hx:  Lives in [place], [place] with   ([place]) Worked [employer] Originally from [place].  Never smoker  Family Hx F GM: breast cancer [facility] sent at [facility]. Follow results  Patient,  (Urbano) had multiple questions which were answered to satisfaction  d/w Dr. Judd  Follow up in 1 week for hydration and OV in 3 weeks for C3 CBC, CMP

## 2024-02-09 ENCOUNTER — RESULT REVIEW (OUTPATIENT)
Age: 44
End: 2024-02-09

## 2024-02-09 ENCOUNTER — APPOINTMENT (OUTPATIENT)
Dept: HEMATOLOGY ONCOLOGY | Facility: CLINIC | Age: 44
End: 2024-02-09
Payer: COMMERCIAL

## 2024-02-09 VITALS
SYSTOLIC BLOOD PRESSURE: 106 MMHG | HEIGHT: 63 IN | HEART RATE: 100 BPM | WEIGHT: 126.06 LBS | OXYGEN SATURATION: 100 % | TEMPERATURE: 98.5 F | DIASTOLIC BLOOD PRESSURE: 66 MMHG | RESPIRATION RATE: 16 BRPM | BODY MASS INDEX: 22.34 KG/M2

## 2024-02-09 DIAGNOSIS — R11.0 NAUSEA: ICD-10-CM

## 2024-02-09 PROCEDURE — G2212 PROLONG OUTPT/OFFICE VIS: CPT

## 2024-02-09 PROCEDURE — 99215 OFFICE O/P EST HI 40 MIN: CPT

## 2024-02-09 RX ORDER — OLANZAPINE 5 MG/1
5 TABLET, FILM COATED ORAL
Qty: 4 | Refills: 3 | Status: ACTIVE | COMMUNITY
Start: 2024-02-09 | End: 1900-01-01

## 2024-02-10 NOTE — HISTORY OF PRESENT ILLNESS
[de-identified] : Arely Luu is a 43 year old female being referred by Dr Schmitz for right breast cancer.   LMP 23 Last mammo 2023 Impression: 1. Bilateral 3-D Screening Mammogram Dense breast parenchyma limits evaluation Highly suspicious irregular mass with spiculation in the 12:00 position of the right breast mid depth with suspicious grouped calcifications noted in area, corresponding to an irregular solid mass highly suspicious mass or breast ultrasound. This correspond to the palpable abnormality that the patient reports. Recommend ultrasound=guided biopsy. BIRADS Category 5 - highly suspicious 2. BILATERAL BREAT ULTRASOUND Palpable right breast 12:00 position mass is seen as irregular taller than wide hypoechoic 1.9 x2.5vm solid mass with microcalcifiations within. Highly suspicious for malignancy. Recommend ultrasound-guided biopsy. Right breast 3:00 retroareolar 0.9x0.7x0.5cm mass. Recommend ultrasound guided biopsy Right breast 7:30 position 11cm from nipple 1.1 x1.2x0.6cm slightly lobulated mass. Recommend ultrasound guided biopsy Right axilla 1.8x1.1x1.9cm prominent lymph node demonstrating a thickened cortex. Recommend ultrasound guided biopsy BI-RADS Category 5- high suspicious, biopsy recommended  CNY; Never Contraception: Withdrawal method Hx Abnormal Pap: denies  G5 3 SABx2,  x3   No new bone pains, weight loss Age at Menarche: 11 years Age at Menopause: LMP 22 Age at first pregnancy: 25 years Total number of pregnancies: 3 children.  Breast feedinm, 12m, 12m OC pills Never  Saw Dr Jelena Schmitz (Breast surgery) and had PET/CT (24)  PET/CT (23) Reviewed and discussed with radiology (Dr Riky Spencer) She has FDG avid right breast mass with multiple lymph node involvement including right axillary, infrapectoral and right supraclavicular node. No distant metastases  She is status post Chemo-Port placement, echocardiogram s/p biopsy of right supraclavicular lymph node 2024 Path: Metastatic carcinoma consistent with breast primary ER>95% strong positive. AR up to 10% moderate to strong positive. Her2: Positive (Score: 3+).  She went into [facility] on 2023 for low grade fever and low back pain - CXR, CT lumbar, and MRI of lumbar were all unremarkable.  She's feeling better since with improved reflux.   [de-identified] : Patient is seen today for jean claude labs after C3 ddAC (1/12/24 - present) Accompanied by her  (Urbano)  Major symptoms-  Nausea- Dex 8 mg x 2 days, zofran PRN Constipation- Started Isabgol. which helps Also has bad taste in the mouth.

## 2024-02-10 NOTE — ASSESSMENT
[FreeTextEntry1] : ## Right breast  IDC Grade 3, 2.9 cm per USG ER (95%) positive, NH (5%) positive, Zui2gqx negative by IHC, KI 67- 60% Right Supraclavicular LN: Wdc8tfh 3+ Right axillary node: Gjl5exb positive by FISH First felt end of July 2023. Unchanged [facility] genetic testing done at [facility], Results pending MRI (12/26/23) at [facility]-: 4.8 cm x 3.1 cm right breast 12:00 mass, extension into medial half of right breast. Right axillary LN marking clip. Left breast normal PET/CT (1/4/23): FDG avid right breast mass with multiple lymph node involvement including right axillary, infrapectoral and right supraclavicular node. No distant metastases Clinical pathological stage IIIB ([id number]) if the supraclavicular node is positive for mets Echo LVEF 62% s/p biopsy of right supraclavicular lymph node 1/9/2024 Path: Metastatic carcinoma consistent with breast primary ER>95% strong positive. [place] up to 10% moderate to strong positive. Her2: Positive (Score: 3+). 1/2024 MRI - neg for brain mets.   She is seen today for C3 ddAC (1/12/2024 - present)  Co nausea from chemotherapy- Increase dexamethasone to 8 mg x 3 days. Also add olanzapine Continue Zofran prn nausea  Labs ordered, drawn in the office, reviewed, analyzed and discussed Continue with Pantoprazole for reflux. proceed with treatment.  Prefers to come for hydration periodically Plan: Adriamycin 60 mg/m2 + Cyclophosphamide 600 mg/m2 every 2 weeks with neulasta x 4 followed by paclitaxel 80 mg/m2 weekly x 12 along with herceptin weekly (4mg/kg loading followed by 2mg/kg weekly) and perjeta (840 mg loading followed by 420 mg weekly) Q3W MRI brain Follow [facility]  Social hx:  Lives in [place], [place] with   (Urbano) Worked [employer] Originally from [place].  Never smoker  Family Hx F GM: breast cancer [facility] sent at [facility]. Follow results  Patient,  (Urbano) had multiple questions which were answered to satisfaction  Follow up in 2 weeks for C4 CBC, CMP

## 2024-02-10 NOTE — PHYSICAL EXAM
[Fully active, able to carry on all pre-disease performance without restriction] : Status 0 - Fully active, able to carry on all pre-disease performance without restriction [Normal] : affect appropriate [de-identified] : Right breast mass softer and smaller. Still palpable. Right axillary node smaller.  [de-identified] : Mild pain around the prior spine surgery site.

## 2024-02-23 ENCOUNTER — APPOINTMENT (OUTPATIENT)
Dept: HEMATOLOGY ONCOLOGY | Facility: CLINIC | Age: 44
End: 2024-02-23
Payer: COMMERCIAL

## 2024-02-23 ENCOUNTER — RESULT REVIEW (OUTPATIENT)
Age: 44
End: 2024-02-23

## 2024-02-23 VITALS
SYSTOLIC BLOOD PRESSURE: 94 MMHG | WEIGHT: 127.5 LBS | HEART RATE: 100 BPM | DIASTOLIC BLOOD PRESSURE: 59 MMHG | TEMPERATURE: 98.1 F | HEIGHT: 63 IN | OXYGEN SATURATION: 100 % | BODY MASS INDEX: 22.59 KG/M2 | RESPIRATION RATE: 16 BRPM

## 2024-02-23 PROCEDURE — G2211 COMPLEX E/M VISIT ADD ON: CPT | Mod: NC,1L

## 2024-02-23 PROCEDURE — 36415 COLL VENOUS BLD VENIPUNCTURE: CPT

## 2024-02-23 PROCEDURE — 99215 OFFICE O/P EST HI 40 MIN: CPT

## 2024-02-23 NOTE — ASSESSMENT
[FreeTextEntry1] : ## Right breast  IDC Grade 3, 2.9 cm per USG ER (95%) positive, [place] (5%) positive, Gtk0yku negative by IHC, KI 67- 60% Right Supraclavicular LN: Ceh7mmq 3+ Right axillary node: Trj2kta positive by FISH First felt end of July 2023. Unchanged [facility]ae genetic testing done at [facility], Results pending MRI (12/26/23) at [facility]-: 4.8 cm x 3.1 cm right breast 12:00 mass, extension into medial half of right breast. Right axillary LN marking clip. Left breast normal PET/CT (1/4/23): FDG avid right breast mass with multiple lymph node involvement including right axillary, infrapectoral and right supraclavicular node. No distant metastases Clinical pathological stage IIIB ([id number]) if the supraclavicular node is positive for mets Echo LVEF 62% s/p biopsy of right supraclavicular lymph node 1/9/2024 Path: Metastatic carcinoma consistent with breast primary ER>95% strong positive. [place] up to 10% moderate to strong positive. Her2: Positive (Score: 3+). 1/2024 MRI - neg for brain mets.   She is seen today for C4 ddAC (1/12/2024 - 2/23/24)  Feels better Nausea from chemotherapy- better with increase dexamethasone to 8 mg x 3 days and adding olanzapine Continue for now Continue Zofran prn nausea  Labs ordered, drawn in the office, reviewed, analyzed and discussed proceed with treatment.  Continue with Pantoprazole for reflux. Prefers to come for hydration periodically Discussed about Taxol herceptin and perjeta in 2 weeks I have reviewed the risks, benefits and side effects of chemotherapy with the patient. Explained about nausea, vomiting, diarrhea/constipation, hair loss, fatigue, mouth sores, infections, bleeding, kidney/liver damage, neuropathy, etc. Diarrhea needs to be watched for All questions were answered to satisfaction. Patient agrees to pursue the planned chemotherapy. Plan: Completing Adriamycin 60 mg/m2 + Cyclophosphamide 600 mg/m2 every 2 weeks x 4 today To start paclitaxel 80 mg/m2 weekly x 12 along with herceptin weekly (4mg/kg loading followed by 2mg/kg weekly) and perjeta (840 mg loading followed by 420 mg weekly) Q3W Follow [facility]  Social hx:  Lives in [place], [place] with   (Urbano) Worked [employer] Originally from [place].  Never smoker  Family Hx F GM: breast cancer [facility] sent at [facility]. Follow results  Patient,  (Urbano) had multiple questions which were answered to satisfaction  Follow up in 2 weeks for C1D1 Taxol herceptin perjeta CBC, CMP, [facility] MRD

## 2024-02-23 NOTE — PHYSICAL EXAM
[Fully active, able to carry on all pre-disease performance without restriction] : Status 0 - Fully active, able to carry on all pre-disease performance without restriction [Normal] : affect appropriate [de-identified] : Right breast mass softer and smaller ~4 cm x 4.cm. Right axillary node barely palpable. Supraclavicular node not palpable [de-identified] : Mild pain around the prior spine surgery site.

## 2024-02-23 NOTE — HISTORY OF PRESENT ILLNESS
[de-identified] : Arely Luu is a 43 year old female being referred by Dr Schmitz for right breast cancer.   LMP 23 Last mammo 2023 Impression: 1. Bilateral 3-D Screening Mammogram Dense breast parenchyma limits evaluation Highly suspicious irregular mass with spiculation in the 12:00 position of the right breast mid depth with suspicious grouped calcifications noted in area, corresponding to an irregular solid mass highly suspicious mass or breast ultrasound. This correspond to the palpable abnormality that the patient reports. Recommend ultrasound=guided biopsy. BIRADS Category 5 - highly suspicious 2. BILATERAL BREAT ULTRASOUND Palpable right breast 12:00 position mass is seen as irregular taller than wide hypoechoic 1.9 x2.5vm solid mass with microcalcifiations within. Highly suspicious for malignancy. Recommend ultrasound-guided biopsy. Right breast 3:00 retroareolar 0.9x0.7x0.5cm mass. Recommend ultrasound guided biopsy Right breast 7:30 position 11cm from nipple 1.1 x1.2x0.6cm slightly lobulated mass. Recommend ultrasound guided biopsy Right axilla 1.8x1.1x1.9cm prominent lymph node demonstrating a thickened cortex. Recommend ultrasound guided biopsy BI-RADS Category 5- high suspicious, biopsy recommended  CNY; Never Contraception: Withdrawal method Hx Abnormal Pap: denies  G5 3 SABx2,  x3   No new bone pains, weight loss Age at Menarche: 11 years Age at Menopause: LMP 22 Age at first pregnancy: 25 years Total number of pregnancies: 3 children.  Breast feedinm, 12m, 12m OC pills Never  Saw Dr Jelena Schmitz (Breast surgery) and had PET/CT (24)  PET/CT (23) Reviewed and discussed with radiology (Dr Riky Spencer) She has FDG avid right breast mass with multiple lymph node involvement including right axillary, infrapectoral and right supraclavicular node. No distant metastases  She is status post Chemo-Port placement, echocardiogram s/p biopsy of right supraclavicular lymph node 2024 Path: Metastatic carcinoma consistent with breast primary ER>95% strong positive. [place] up to 10% moderate to strong positive. Her2: Positive (Score: 3+).  She went into [facility] on 2023 for low grade fever and low back pain - CXR, CT lumbar, and MRI of lumbar were all unremarkable.  She's feeling better since with improved reflux.   [de-identified] : Patient is seen today for jean claude labs after C4 ddAC (1/12/24 - 2/23/24) Accompanied by her  (Urbano)  Nausea- much better with addition of olanzapine x 5 days, Dex 8 mg x 3 days. Took zofran Once daily until 2 days prior Constipation- Started Isabgol. which helps Also has bad taste in the mouth.  Scheduled for ultrasound March 4 and follow up with Dr Schmitz March 6, 2023

## 2024-03-03 ENCOUNTER — RESULT REVIEW (OUTPATIENT)
Age: 44
End: 2024-03-03

## 2024-03-04 NOTE — REVIEW OF SYSTEMS
[Eyesight Problems] : eyesight problems [Feeling Tired] : feeling tired [Sore Throat] : sore throat [Arthralgias] : arthralgias [Hand Stiffness] : stiffness of the hand [Hand Pain] : hand pain [Upper Back Pain] : upper back pain [Lower Back Pain] : lower back pain [Breast Pain] : breast pain [Breast Lump] : breast lump [Breast Swelling] : breast swelling [Breast Warmth] : breast warmth [Breast Itching] : breast itching [Negative] : Heme/Lymph

## 2024-03-06 ENCOUNTER — APPOINTMENT (OUTPATIENT)
Dept: BREAST CENTER | Facility: CLINIC | Age: 44
End: 2024-03-06
Payer: COMMERCIAL

## 2024-03-06 VITALS
WEIGHT: 127 LBS | BODY MASS INDEX: 22.5 KG/M2 | HEIGHT: 63 IN | HEART RATE: 100 BPM | SYSTOLIC BLOOD PRESSURE: 113 MMHG | DIASTOLIC BLOOD PRESSURE: 71 MMHG

## 2024-03-06 DIAGNOSIS — Z80.3 FAMILY HISTORY OF MALIGNANT NEOPLASM OF BREAST: ICD-10-CM

## 2024-03-06 PROCEDURE — 99215 OFFICE O/P EST HI 40 MIN: CPT

## 2024-03-06 RX ORDER — ETOH/EUC OIL/MENTH/PEP/WINTERG
SPRAY, NON-AEROSOL (ML) MUCOUS MEMBRANE
Qty: 1 | Refills: 0 | Status: DISCONTINUED | COMMUNITY
Start: 2024-01-09 | End: 2024-03-06

## 2024-03-06 NOTE — ASSESSMENT
[FreeTextEntry1] : The pathology and imaging results were reviewed and discussed. She is a stage IIB right breast cancer (T2N1M0) HER2 negative ER+/AZ+ (AJCC 8thed).  The use of multimodality therapy for the treatment of breast cancer was discussed.   Surgical options were reviewed including a lumpectomy to negative margins, with whole breast radiation therapy versus a mastectomy with or without reconstruction. Included in this discussion with the results of the NSABP-04 and 06 trials.  Mastectomy techniques were discussed in detail including skin sparing and nipple sparing techniques. Recurrence was reviewed and that mastectomy does not confer a 100% risk reduction nor guarantee against breast cancer in the future.  Reconstructive options were briefly reviewed, including implant based versus tissue flap based reconstruction options.  Referral to a plastic surgeon was offered.  The patient was informed that breast reconstruction is covered by insurance per state and federal laws.     Axillary staging was discussed. We reviewed the sentinel lymph node procedure, and how if the sentinel lymph node is found to have metastatic disease either on the intraoperative evaluation or on the final pathology, that an axillary dissection of the remaining lymph nodes may be recommended. It was explained that an axillary dissection takes "level one and level two" lymph nodes, and also "level three" if they feel clinically suspicious. It was explained that if the sentinel lymph node was not able to be found, that an axillary dissection may be necessary.  I reviewed the risks of lymphedema for SLND (6%) versus ALND (20%). I reviewed our lymphedema monitoring program.   The Z-0011 study was touched upon, outlining that there is evidence that it may not be necessary to complete an axillary dissection in select patients even if one or two sentinel nodes are positive for cancer, as long as the cancer in the nodes is confined to within the nodes, the nodes aren't  matted down, and if the cancer meets certain criteria including being less than 5 cm, treated by lumpectomy and conventional whole breast radiation, and the patient is planning to take recommended adjuvant therapy, and didn't require preoperative chemotherapy.     The general indications for the use of adjuvant hormonal and chemotherapy and targeted therapies were discussed. It was explained that medical treatments are dependent on pathology results including receptor studies.  The patient was advised to meet with a medical oncologist since she is node positive. It was explained that some patients have further genetic testing of their tumors before a decision about chemotherapy can be made.  The indications for radiation therapy and side effects were also discussed including the use varying lengths of whole breast radiation.  It was explained that radiation is generally reserved for lumpectomy patients, and patients with larger tumors or positive lymph nodes. Common side effects were reviewed. She is not a candidate for intraoperative radiation therapy.  We discussed mastectomy and post mastectomy radiation. She is strongly motivated towards autologous reconstruction. Will re-address post MRI as she would like breast-conserving surgery.  I believe I was able to answer all of her questions to her satisfaction.

## 2024-03-06 NOTE — HISTORY OF PRESENT ILLNESS
[FreeTextEntry1] : Arely is a 43 year old female referred by Dr. 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  She noted a painful right breast mass while she was out of the country in [facility]. At that time an ultrasound was ordered and revealed a 2.6 x 3.6 cm right breast mass. Upon return she consulted with her GYN Dr Tam (12/5/23) at which time diagnostic imaging was ordered.  Bilateral diagnostic mammogram (12/14/2023, [facility]) showed extremely dense breast tissue and a right 12:00 asymmetry and distortion in area of palpable concern.  Ultrasound findings showed a right 12:00 2.7 x 2.9 cm irregular hypoechoic lesion with internal calcifications correlating to mammogram findings. Additional ultrasound findings are a right 3:00 reto 0.9 x 0.7 x 0.5 cm irregular hypoechoic lesion and a right 7:30 N11 1.1 x 1.2 x 0.6 cm lobulated hypodense mass. Ultrasound biopsy of all three breast masses were recommended. A prominent right axillary lymph node measuring 1.8 1.1 x 1.9 cm with a thickened cortex suspicious for metastatic disease was also seen and ultrasound biopsy recommended. No suspicious findings on mammogram of the left breast was noted. Ultrasound was unilateral.  Arely underwent the recommended biopsies on 12/20/2023 ([facility]) Pathology findings: Right 12:00  (Coil-shaped clip)IDC, poorly differentiated, ER strongly positive in 95%, NJ moderately positive in 5%, Her2 negative, and Ki67 60% Right axilla lymph node (hydro-marking clip) containing foci of metastatic carcinoma (receptors not repeated on lymph node tissue) Right 3:00 9ribbon-shaped clip) fibroadenoma Right 7:30 (wing-shaped clip)fibroadenoma  She has been in the [place] 2 [place] and used to work for the [employer]. She had two breast reductions in [facility] one in 2001 and the second in 2017.  She is on NAC ddAC recently completed 2/23/24 asnd will  be starting THP soon. She plans to be done with chemotherapy on 5/24/2024.   She does SBE.  She has not noticed a change in her breast or a breast lump on left. On right mass has gotten bigger since biopsy. She has not noticed a change in her nipple or nipple area. She has not noticed a change in the skin of the breast. except post bx changes on right She is not experiencing nipple discharge. She is experiencing breast pain. Bilateral  She has noticed a lump or lymph node under the armpit on the right only.  BREAST CANCER RISK FACTORS Menarche: 11 Date of LMP: 02/13/2024 Menopause: pre Grav: 5     Para:  3 (17, 13, 3 yo sons) Age at first live birth: 25 Nursed: yes Hysterectomy: no Oophorectomy: no  OCP: no HRT: no  Last pap/pelvic exam: 12/2023 WNL Related family history: pat GM BCA@ 50 Ashkenazi: no Mastery risk assessment: n/a BRCA testing:  [facility]ae 12/27/2023 (Dr. Tam) BRIP1 VUS and CHEK 2 VUS Bra size:  34 DD  Last mammogram: 12/14 2023                              Location: [facility] Report reviewed.                                 Images reviewed. Results: Birads 5 Extremely dense breast tissue. Right 12:00 asymmetry and distortion in area of palpable concern.    Last ultrasound: 03/04/2024                                 Location: [facility] Report reviewed.                                 Images reviewed.  Results: Birads 6 AT 12:00 in the right breast 1 cm f/n at the site of known malignancy there is a heterogeneous irregular mass measuring 2 x 2 2.2 cm with associated color flow and an associated echogenic marker. this surrounding hardness on elastography.    Last MRI:  12/26/2023                                           [facility]: [facility] Report reviewed. Results:BIRADS: 2 Right breast: Large areas of abnormal non mass enhancement in the right breast as described above extending from the 12:00 position (primary palpable lesion bx proven to be IDC) Left breast: No evidence of suspicious foci of enhancement.

## 2024-03-06 NOTE — CONSULT LETTER
[Dear  ___] : Dear  [unfilled], [( Thank you for referring [unfilled] for consultation for _____ )] : Thank you for referring [unfilled] for consultation for [unfilled] [Please see my note below.] : Please see my note below. [Consult Closing:] : Thank you very much for allowing me to participate in the care of this patient.  If you have any questions, please do not hesitate to contact me. [Sincerely,] : Sincerely, [DrPam  ___] : Dr. CARDENAS [Courtesy Letter:] : I had the pleasure of seeing your patient, [unfilled], in my office today. [FreeTextEntry3] : Jelena Schmitz [place] [address]

## 2024-03-06 NOTE — PHYSICAL EXAM
[Normocephalic] : normocephalic [Atraumatic] : atraumatic [Supple] : supple [No Supraclavicular Adenopathy] : no supraclavicular adenopathy [Examined in the supine and seated position] : examined in the supine and seated position [Symmetrical] : symmetrical [No dominant masses] : no dominant masses left breast [No Nipple Retraction] : no right nipple retraction [No Axillary Lymphadenopathy] : no left axillary lymphadenopathy [No Nipple Discharge] : no left nipple discharge [No Edema] : no edema [No Rashes] : no rashes [No Ulceration] : no ulceration [de-identified] : s/p wise pattern bilaterally, NAC viable [de-identified] : 12:00 no longer 4cm mass underlying NAC, skin is soft [de-identified] : palp node, c/w known mets

## 2024-03-08 ENCOUNTER — RESULT REVIEW (OUTPATIENT)
Age: 44
End: 2024-03-08

## 2024-03-08 ENCOUNTER — APPOINTMENT (OUTPATIENT)
Dept: HEMATOLOGY ONCOLOGY | Facility: CLINIC | Age: 44
End: 2024-03-08
Payer: COMMERCIAL

## 2024-03-08 PROCEDURE — G2211 COMPLEX E/M VISIT ADD ON: CPT | Mod: NC,1L

## 2024-03-08 PROCEDURE — 99215 OFFICE O/P EST HI 40 MIN: CPT

## 2024-03-08 PROCEDURE — 36415 COLL VENOUS BLD VENIPUNCTURE: CPT

## 2024-03-08 NOTE — PHYSICAL EXAM
[Fully active, able to carry on all pre-disease performance without restriction] : Status 0 - Fully active, able to carry on all pre-disease performance without restriction [Normal] : grossly intact [de-identified] : Right breast mass softer and smaller ~4 cm x 4.cm. Right axillary node barely palpable. Supraclavicular node not palpable [de-identified] : Mild pain around the prior spine surgery site.

## 2024-03-08 NOTE — ASSESSMENT
[FreeTextEntry1] : ## Right breast  IDC Grade 3, 2.9 cm per USG ER (95%) positive, HI (5%) positive, Lga5uqu negative by IHC, KI 67- 60% Right Supraclavicular LN: Etq6enp 3+ Right axillary node: Ash8anq positive by FISH First felt end of July 2023. Unchanged [facility] genetic testing done at [facility], Results pending MRI (12/26/23) at [facility]-: 4.8 cm x 3.1 cm right breast 12:00 mass, extension into medial half of right breast. Right axillary LN marking clip. Left breast normal PET/CT (1/4/23): FDG avid right breast mass with multiple lymph node involvement including right axillary, infrapectoral and right supraclavicular node. No distant metastases Clinical pathological stage IIIB ([id number]) if the supraclavicular node is positive for mets Echo LVEF 62% s/p biopsy of right supraclavicular lymph node 1/9/2024 Path: Metastatic carcinoma consistent with breast primary ER>95% strong positive. HI up to 10% moderate to strong positive. Her2: Positive (Score: 3+). 1/2024 MRI - neg for brain mets.  Status post C4 ddAC (1/12/2024 - 2/23/24)   She is seen today for follow-up and begin cycle 1 day 1 of THP (3/8/2024) Clinically doing well Breast mass continues to get better Labs ordered, drawn in the office, reviewed, analyzed and discussed proceed with treatment.  I Imodium as needed for diarrhea-instructions given in writing Hydration encouraged especially in the event of diarrhea Echocardiogram in June 2024 She is inquiring about CT DNA testing.  Signatera test prior to the treatment was positive.  Explained about the implication, and the testing is still in early phases without any standardized recommendation.  She and her  understood plan: Adriamycin 60 mg/m2 + Cyclophosphamide 600 mg/m2 every 2 weeks x 4 followed by paclitaxel 80 mg/m2 weekly x 12 along with herceptin weekly (4mg/kg loading followed by 2mg/kg weekly) and perjeta (840 mg loading followed by 420 mg weekly) Q3W Follow Pola sent today  Social hx:  Lives in [place], [place] with   (Urbano) Worked [employer] Originally from [place].  Never smoker  Family Hx F GM: breast cancer [facility] sent at [facility]. Follow results  Patient,  (Urbano) had multiple questions which were answered to satisfaction  Follow up in 1 week for C1D8 Taxol herceptin perjeta CBC, CMP, magnesium

## 2024-03-08 NOTE — HISTORY OF PRESENT ILLNESS
[de-identified] : Arely Luu is a 43 year old female being referred by Dr Schmitz for right breast cancer.   LMP 23 Last mammo 2023 Impression: 1. Bilateral 3-D Screening Mammogram Dense breast parenchyma limits evaluation Highly suspicious irregular mass with spiculation in the 12:00 position of the right breast mid depth with suspicious grouped calcifications noted in area, corresponding to an irregular solid mass highly suspicious mass or breast ultrasound. This correspond to the palpable abnormality that the patient reports. Recommend ultrasound=guided biopsy. BIRADS Category 5 - highly suspicious 2. BILATERAL BREAT ULTRASOUND Palpable right breast 12:00 position mass is seen as irregular taller than wide hypoechoic 1.9 x2.5vm solid mass with microcalcifiations within. Highly suspicious for malignancy. Recommend ultrasound-guided biopsy. Right breast 3:00 retroareolar 0.9x0.7x0.5cm mass. Recommend ultrasound guided biopsy Right breast 7:30 position 11cm from nipple 1.1 x1.2x0.6cm slightly lobulated mass. Recommend ultrasound guided biopsy Right axilla 1.8x1.1x1.9cm prominent lymph node demonstrating a thickened cortex. Recommend ultrasound guided biopsy BI-RADS Category 5- high suspicious, biopsy recommended  CNY; Never Contraception: Withdrawal method Hx Abnormal Pap: denies  G5 3 SABx2,  x3   No new bone pains, weight loss Age at Menarche: 11 years Age at Menopause: LMP 22 Age at first pregnancy: 25 years Total number of pregnancies: 3 children.  Breast feedinm, 12m, 12m OC pills Never  Saw Dr Jelena Schmitz (Breast surgery) and had PET/CT (24)  PET/CT (23) Reviewed and discussed with radiology (Dr Riky Spencer) She has FDG avid right breast mass with multiple lymph node involvement including right axillary, infrapectoral and right supraclavicular node. No distant metastases  She is status post Chemo-Port placement, echocardiogram s/p biopsy of right supraclavicular lymph node 2024 Path: Metastatic carcinoma consistent with breast primary ER>95% strong positive. WA up to 10% moderate to strong positive. Her2: Positive (Score: 3+).  She went into [facility] on 2023 for low grade fever and low back pain - CXR, CT lumbar, and MRI of lumbar were all unremarkable.  She's feeling better since with improved reflux.   [de-identified] : Patient is seen today for follow up and C1 THP (3/8/24) s/p C4 ddAC (1/12/24 - 2/23/24) Accompanied by her  (Urbano)  Had appt with Dr Schmitz   Ultrasound (3/4/24): Stable findings

## 2024-03-08 NOTE — RESULTS/DATA
[FreeTextEntry1] : Labs ordered, drawn in the office, reviewed, analyzed and discussed  Signatera: 6.13 (12/27/2023)

## 2024-03-15 ENCOUNTER — RESULT REVIEW (OUTPATIENT)
Age: 44
End: 2024-03-15

## 2024-03-15 ENCOUNTER — APPOINTMENT (OUTPATIENT)
Dept: HEMATOLOGY ONCOLOGY | Facility: CLINIC | Age: 44
End: 2024-03-15
Payer: COMMERCIAL

## 2024-03-15 VITALS
DIASTOLIC BLOOD PRESSURE: 61 MMHG | TEMPERATURE: 97.9 F | BODY MASS INDEX: 22.55 KG/M2 | WEIGHT: 127.25 LBS | RESPIRATION RATE: 16 BRPM | OXYGEN SATURATION: 99 % | HEART RATE: 108 BPM | HEIGHT: 63 IN | SYSTOLIC BLOOD PRESSURE: 114 MMHG

## 2024-03-15 DIAGNOSIS — K12.30 ORAL MUCOSITIS (ULCERATIVE), UNSPECIFIED: ICD-10-CM

## 2024-03-15 PROCEDURE — 36415 COLL VENOUS BLD VENIPUNCTURE: CPT

## 2024-03-15 PROCEDURE — 99214 OFFICE O/P EST MOD 30 MIN: CPT | Mod: 24

## 2024-03-15 PROCEDURE — G2211 COMPLEX E/M VISIT ADD ON: CPT | Mod: NC,1L

## 2024-03-15 NOTE — HISTORY OF PRESENT ILLNESS
[de-identified] : Arely Luu is a 43 year old female being referred by Dr Schmitz for right breast cancer.   LMP 23 Last mammo 2023 Impression: 1. Bilateral 3-D Screening Mammogram Dense breast parenchyma limits evaluation Highly suspicious irregular mass with spiculation in the 12:00 position of the right breast mid depth with suspicious grouped calcifications noted in area, corresponding to an irregular solid mass highly suspicious mass or breast ultrasound. This correspond to the palpable abnormality that the patient reports. Recommend ultrasound=guided biopsy. BIRADS Category 5 - highly suspicious 2. BILATERAL BREAT ULTRASOUND Palpable right breast 12:00 position mass is seen as irregular taller than wide hypoechoic 1.9 x2.5vm solid mass with microcalcifiations within. Highly suspicious for malignancy. Recommend ultrasound-guided biopsy. Right breast 3:00 retroareolar 0.9x0.7x0.5cm mass. Recommend ultrasound guided biopsy Right breast 7:30 position 11cm from nipple 1.1 x1.2x0.6cm slightly lobulated mass. Recommend ultrasound guided biopsy Right axilla 1.8x1.1x1.9cm prominent lymph node demonstrating a thickened cortex. Recommend ultrasound guided biopsy BI-RADS Category 5- high suspicious, biopsy recommended  CNY; Never Contraception: Withdrawal method Hx Abnormal Pap: denies  G5 3 SABx2,  x3   No new bone pains, weight loss Age at Menarche: 11 years Age at Menopause: LMP 22 Age at first pregnancy: 25 years Total number of pregnancies: 3 children.  Breast feedinm, 12m, 12m OC pills Never  Saw Dr Jelena Schmitz (Breast surgery) and had PET/CT (24)  PET/CT (23) Reviewed and discussed with radiology (Dr Riky Spencer) She has FDG avid right breast mass with multiple lymph node involvement including right axillary, infrapectoral and right supraclavicular node. No distant metastases  She is status post Chemo-Port placement, echocardiogram s/p biopsy of right supraclavicular lymph node 2024 Path: Metastatic carcinoma consistent with breast primary ER>95% strong positive. MO up to 10% moderate to strong positive. Her2: Positive (Score: 3+).  She went into [facility] on 2023 for low grade fever and low back pain - CXR, CT lumbar, and MRI of lumbar were all unremarkable.  She's feeling better since with improved reflux.   [de-identified] : Patient is seen today for follow up and C1D8 THP (3/8/24) s/p C4 ddAC (1/12/24 - 2/23/24) Accompanied by her  (Urbano)  She has insomnia x 3 days after each chemo treatment Been having sore throat with sores at tip of her tongue.   Ultrasound (3/4/24): Stable findings

## 2024-03-15 NOTE — ASSESSMENT
[FreeTextEntry1] : ## Right breast  IDC Grade 3, 2.9 cm per USG ER (95%) positive, WA (5%) positive, Nkn0qyt negative by IHC, KI 67- 60% Right Supraclavicular LN: Czz5yae 3+ Right axillary node: Llf4nsj positive by FISH First felt end of July 2023. Unchanged [facility] genetic testing done at [facility], Results pending MRI (12/26/23) at [facility]-: 4.8 cm x 3.1 cm right breast 12:00 mass, extension into medial half of right breast. Right axillary LN marking clip. Left breast normal PET/CT (1/4/23): FDG avid right breast mass with multiple lymph node involvement including right axillary, infrapectoral and right supraclavicular node. No distant metastases Clinical pathological stage IIIB ([id number]) if the supraclavicular node is positive for mets Echo LVEF 62% s/p biopsy of right supraclavicular lymph node 1/9/2024 Path: Metastatic carcinoma consistent with breast primary ER>95% strong positive. WA up to 10% moderate to strong positive. Her2: Positive (Score: 3+). 1/2024 MRI - neg for brain mets.  Status post C4 ddAC (1/12/2024 - 2/23/24)   She is seen today for C1D8 of THP (3/8/2024 - present) Given her insomnia and improved nausea, advised to cut down on Decadron 4 mg once daily x 3 days instead of 2 tablets. Will also decrease Benadryl 25 mg IV with Taxol due to grogginess for the rest of the day.  Labs ordered, drawn in the office, reviewed, analyzed and discussed -Given magic mouthwash for mouth sores and throat pain.  Echocardiogram in June 2024 Porfirio Escalona ordered - results pending.   plan: Adriamycin 60 mg/m2 + Cyclophosphamide 600 mg/m2 every 2 weeks x 4 followed by paclitaxel 80 mg/m2 weekly x 12 along with herceptin weekly (4mg/kg loading followed by 2mg/kg weekly) and perjeta (840 mg loading followed by 420 mg weekly) Q3W Follow Pola sent today  Social hx:  Lives in [place], [place] with   (Urbano) Worked [employer] Originally from [place].  Never smoker  Family Hx F GM: breast cancer [facility]ae sent at [facility]. Follow results  Patient,  (Urbano) had multiple questions which were answered to satisfaction  d/w Dr. Judd  Follow up in 1 week for C1D15 Taxol herceptin perjeta CBC, CMP, magnesium

## 2024-03-15 NOTE — PHYSICAL EXAM
[Fully active, able to carry on all pre-disease performance without restriction] : Status 0 - Fully active, able to carry on all pre-disease performance without restriction [Normal] : affect appropriate [de-identified] : Mild pain around the prior spine surgery site.  [de-identified] : Right breast mass softer and smaller ~4 cm x 4.cm. Right axillary node barely palpable. Supraclavicular node not palpable

## 2024-03-22 ENCOUNTER — APPOINTMENT (OUTPATIENT)
Dept: HEMATOLOGY ONCOLOGY | Facility: CLINIC | Age: 44
End: 2024-03-22
Payer: COMMERCIAL

## 2024-03-22 ENCOUNTER — RESULT REVIEW (OUTPATIENT)
Age: 44
End: 2024-03-22

## 2024-03-22 VITALS
HEART RATE: 113 BPM | OXYGEN SATURATION: 100 % | TEMPERATURE: 97 F | DIASTOLIC BLOOD PRESSURE: 66 MMHG | HEIGHT: 63 IN | SYSTOLIC BLOOD PRESSURE: 112 MMHG | RESPIRATION RATE: 17 BRPM | BODY MASS INDEX: 22.57 KG/M2 | WEIGHT: 127.38 LBS

## 2024-03-22 DIAGNOSIS — F19.982 OTHER PSYCHOACTIVE SUBSTANCE USE, UNSPECIFIED WITH PSYCHOACTIVE SUBSTANCE-INDUCED SLEEP DISORDER: ICD-10-CM

## 2024-03-22 DIAGNOSIS — G47.00 INSOMNIA, UNSPECIFIED: ICD-10-CM

## 2024-03-22 PROCEDURE — 99215 OFFICE O/P EST HI 40 MIN: CPT

## 2024-03-22 PROCEDURE — G2211 COMPLEX E/M VISIT ADD ON: CPT | Mod: NC,1L

## 2024-03-22 PROCEDURE — 36415 COLL VENOUS BLD VENIPUNCTURE: CPT

## 2024-03-22 RX ORDER — ZOLPIDEM TARTRATE SUBLINGUAL 3.5 MG/1
3.5 TABLET SUBLINGUAL
Qty: 30 | Refills: 0 | Status: ACTIVE | COMMUNITY
Start: 2024-03-22 | End: 1900-01-01

## 2024-03-22 NOTE — ASSESSMENT
[FreeTextEntry1] : ## Right breast  IDC Grade 3, 2.9 cm per USG ER (95%) positive, WV (5%) positive, Hoi7nji negative by IHC, KI 67- 60% Right Supraclavicular LN: Bfa4gjj 3+ Right axillary node: Kie3vud positive by FISH First felt end of July 2023. Unchanged [facility]ae genetic testing done at [facility], Results pending MRI (12/26/23) at [facility]-: 4.8 cm x 3.1 cm right breast 12:00 mass, extension into medial half of right breast. Right axillary LN marking clip. Left breast normal PET/CT (1/4/23): FDG avid right breast mass with multiple lymph node involvement including right axillary, infrapectoral and right supraclavicular node. No distant metastases Clinical pathological stage IIIB ([id number]) if the supraclavicular node is positive for mets Echo LVEF 62% s/p biopsy of right supraclavicular lymph node 1/9/2024 Path: Metastatic carcinoma consistent with breast primary ER>95% strong positive. WV up to 10% moderate to strong positive. Her2: Positive (Score: 3+). 1/2024 MRI - neg for brain mets.  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 Discussed about dose reduction.  She is interested. Dose reduce Taxol to 70 mg/m cycle 1 day 15 Insomnia: DC dexamethasone and monitor.  Zofran as needed for nausea.  Can take dexamethasone if starts to have any allergic symptoms.  Ambien as needed for insomnia Labs ordered, drawn in the office, reviewed, analyzed and discussed Echocardiogram in June 2024  Follow Signatera result plan: Adriamycin 60 mg/m2 + Cyclophosphamide 600 mg/m2 every 2 weeks x 4 followed by paclitaxel 80 mg/m2 weekly x 12 along with herceptin weekly (4mg/kg loading followed by 2mg/kg weekly) and perjeta (840 mg loading followed by 420 mg weekly) Q3W  Social hx:  Lives in [place], [place] with   (Urbano) Worked [employer] Originally from [place].  Never smoker  Family Hx F GM: breast cancer [facility]itae sent at [facility]. Follow results  Patient,  (Urbano) had multiple questions which were answered to satisfaction  Follow up in 1 week for C2D1 Taxol herceptin perjeta CBC, CMP, magnesium

## 2024-03-22 NOTE — PHYSICAL EXAM
[Fully active, able to carry on all pre-disease performance without restriction] : Status 0 - Fully active, able to carry on all pre-disease performance without restriction [Normal] : affect appropriate [de-identified] : Right breast mass softer and significantly smaller ~2 cm x 2.cm. Right axillary node not palpable. Supraclavicular node not palpable [de-identified] : Mild pain around the prior spine surgery site.

## 2024-03-22 NOTE — HISTORY OF PRESENT ILLNESS
[de-identified] : Arely Luu is a 43 year old female being referred by Dr Schmitz for right breast cancer.   LMP 23 Last mammo 2023 Impression: 1. Bilateral 3-D Screening Mammogram Dense breast parenchyma limits evaluation Highly suspicious irregular mass with spiculation in the 12:00 position of the right breast mid depth with suspicious grouped calcifications noted in area, corresponding to an irregular solid mass highly suspicious mass or breast ultrasound. This correspond to the palpable abnormality that the patient reports. Recommend ultrasound=guided biopsy. BIRADS Category 5 - highly suspicious 2. BILATERAL BREAT ULTRASOUND Palpable right breast 12:00 position mass is seen as irregular taller than wide hypoechoic 1.9 x2.5vm solid mass with microcalcifiations within. Highly suspicious for malignancy. Recommend ultrasound-guided biopsy. Right breast 3:00 retroareolar 0.9x0.7x0.5cm mass. Recommend ultrasound guided biopsy Right breast 7:30 position 11cm from nipple 1.1 x1.2x0.6cm slightly lobulated mass. Recommend ultrasound guided biopsy Right axilla 1.8x1.1x1.9cm prominent lymph node demonstrating a thickened cortex. Recommend ultrasound guided biopsy BI-RADS Category 5- high suspicious, biopsy recommended  CNY; Never Contraception: Withdrawal method Hx Abnormal Pap: denies  G5 3 SABx2,  x3   No new bone pains, weight loss Age at Menarche: 11 years Age at Menopause: LMP 22 Age at first pregnancy: 25 years Total number of pregnancies: 3 children.  Breast feedinm, 12m, 12m OC pills Never  Saw Dr Jelena Schmitz (Breast surgery) and had PET/CT (24)  PET/CT (23) Reviewed and discussed with radiology (Dr Riky Spencer) She has FDG avid right breast mass with multiple lymph node involvement including right axillary, infrapectoral and right supraclavicular node. No distant metastases  She is status post Chemo-Port placement, echocardiogram s/p biopsy of right supraclavicular lymph node 2024 Path: Metastatic carcinoma consistent with breast primary ER>95% strong positive. NM up to 10% moderate to strong positive. Her2: Positive (Score: 3+).  She went into [facility] on 2023 for low grade fever and low back pain - CXR, CT lumbar, and MRI of lumbar were all unremarkable.  She's feeling better since with improved reflux.   Ultrasound (3/4/24): Stable findings [de-identified] : Patient is seen today for follow up and C1D15 THP (3/8/24 - present ) s/p C4 ddAC (1/12/24 - 2/23/24) Accompanied by her  (Urbano)  C/o fatigue and tiredness-> worse 2 days after chemo.  Does not come out of her room. She has insomnia x 3 days after each chemo treatment-> has cut down dexamethasone 4 mg x 2 -> advised to hold dex. Wants to try ambien

## 2024-03-29 ENCOUNTER — RESULT REVIEW (OUTPATIENT)
Age: 44
End: 2024-03-29

## 2024-03-29 ENCOUNTER — APPOINTMENT (OUTPATIENT)
Dept: HEMATOLOGY ONCOLOGY | Facility: CLINIC | Age: 44
End: 2024-03-29
Payer: COMMERCIAL

## 2024-03-29 VITALS
RESPIRATION RATE: 16 BRPM | BODY MASS INDEX: 22.4 KG/M2 | DIASTOLIC BLOOD PRESSURE: 67 MMHG | WEIGHT: 126.44 LBS | OXYGEN SATURATION: 99 % | SYSTOLIC BLOOD PRESSURE: 110 MMHG | HEIGHT: 63 IN | TEMPERATURE: 96.8 F | HEART RATE: 119 BPM

## 2024-03-29 PROCEDURE — G2211 COMPLEX E/M VISIT ADD ON: CPT | Mod: NC,1L

## 2024-03-29 PROCEDURE — 99214 OFFICE O/P EST MOD 30 MIN: CPT

## 2024-03-29 PROCEDURE — 36415 COLL VENOUS BLD VENIPUNCTURE: CPT

## 2024-03-29 NOTE — PHYSICAL EXAM
[Fully active, able to carry on all pre-disease performance without restriction] : Status 0 - Fully active, able to carry on all pre-disease performance without restriction [Normal] : affect appropriate [de-identified] : Right breast mass softer and significantly smaller ~2 cm x 2.cm. Right axillary node not palpable. Supraclavicular node not palpable [de-identified] : Mild pain around the prior spine surgery site.

## 2024-03-29 NOTE — HISTORY OF PRESENT ILLNESS
[de-identified] : Arely Luu is a 43 year old female being referred by Dr Schmitz for right breast cancer.   LMP 23 Last mammo 2023 Impression: 1. Bilateral 3-D Screening Mammogram Dense breast parenchyma limits evaluation Highly suspicious irregular mass with spiculation in the 12:00 position of the right breast mid depth with suspicious grouped calcifications noted in area, corresponding to an irregular solid mass highly suspicious mass or breast ultrasound. This correspond to the palpable abnormality that the patient reports. Recommend ultrasound=guided biopsy. BIRADS Category 5 - highly suspicious 2. BILATERAL BREAT ULTRASOUND Palpable right breast 12:00 position mass is seen as irregular taller than wide hypoechoic 1.9 x2.5vm solid mass with microcalcifiations within. Highly suspicious for malignancy. Recommend ultrasound-guided biopsy. Right breast 3:00 retroareolar 0.9x0.7x0.5cm mass. Recommend ultrasound guided biopsy Right breast 7:30 position 11cm from nipple 1.1 x1.2x0.6cm slightly lobulated mass. Recommend ultrasound guided biopsy Right axilla 1.8x1.1x1.9cm prominent lymph node demonstrating a thickened cortex. Recommend ultrasound guided biopsy BI-RADS Category 5- high suspicious, biopsy recommended  CNY; Never Contraception: Withdrawal method Hx Abnormal Pap: denies  G5 3 SABx2,  x3   No new bone pains, weight loss Age at Menarche: 11 years Age at Menopause: LMP 22 Age at first pregnancy: 25 years Total number of pregnancies: 3 children.  Breast feedinm, 12m, 12m OC pills Never  Saw Dr Jelena Schmitz (Breast surgery) and had PET/CT (24)  PET/CT (23) Reviewed and discussed with radiology (Dr Riky Spencer) She has FDG avid right breast mass with multiple lymph node involvement including right axillary, infrapectoral and right supraclavicular node. No distant metastases  She is status post Chemo-Port placement, echocardiogram s/p biopsy of right supraclavicular lymph node 2024 Path: Metastatic carcinoma consistent with breast primary ER>95% strong positive. SD up to 10% moderate to strong positive. Her2: Positive (Score: 3+).  She went into [facility] on 2023 for low grade fever and low back pain - CXR, CT lumbar, and MRI of lumbar were all unremarkable.  She's feeling better since with improved reflux.   Ultrasound (3/4/24): Stable findings [de-identified] : Patient is seen today for follow up and C2D1 THP (3/8/24 - present ) s/p C4 ddAC (1/12/24 - 2/23/24) Accompanied by her  (Urbano)  Had diarrhea x 1 day post chemo Sleeping better without taking ambien

## 2024-03-29 NOTE — ASSESSMENT
[FreeTextEntry1] : ## Right breast  IDC Grade 3, 2.9 cm per USG ER (95%) positive, IA (5%) positive, Wwf1fux negative by IHC, KI 67- 60% Right Supraclavicular LN: Jmp5fmx 3+ Right axillary node: Mas4mel positive by FISH First felt end of July 2023. Unchanged [facility] genetic testing done at [facility], Results pending MRI (12/26/23) at [facility]-: 4.8 cm x 3.1 cm right breast 12:00 mass, extension into medial half of right breast. Right axillary LN marking clip. Left breast normal PET/CT (1/4/23): FDG avid right breast mass with multiple lymph node involvement including right axillary, infrapectoral and right supraclavicular node. No distant metastases Clinical pathological stage IIIB ([id number]) if the supraclavicular node is positive for mets Echo LVEF 62% s/p biopsy of right supraclavicular lymph node 1/9/2024 Path: Metastatic carcinoma consistent with breast primary ER>95% strong positive. IA up to 10% moderate to strong positive. Her2: Positive (Score: 3+). 1/2024 MRI - neg for brain mets.  Status post C4 ddAC (1/12/2024 - 2/23/24)   She is seen today for C2D1 THP (3/8/2024 - present) Symptoms better since dose reducing taxol and discontinuing dexamethasone Insomnia: better with dC dexamethasone.  Zofran as needed for nausea.  Ambien as needed for insomnia Labs ordered, drawn in the office, reviewed, analyzed and discussed COntinue with C2D1 THP Echocardiogram in June 2024  Follow Signatera result plan: Adriamycin 60 mg/m2 + Cyclophosphamide 600 mg/m2 every 2 weeks x 4 followed by paclitaxel 80 mg/m2 weekly x 12 along with herceptin weekly (4mg/kg loading followed by 2mg/kg weekly) and perjeta (840 mg loading followed by 420 mg weekly) Q3W  Social hx:  Lives in [place], [place] with   (Urbano) Worked [employer] Originally from [place].  Never smoker  Family Hx F GM: breast cancer [facility] sent at [facility]. Follow results  Patient,  (Urbano) had multiple questions which were answered to satisfaction  She will continue with weekly taxol and follow up in 3 weeks for C3D1 Taxol herceptin aquilino CBC, CMP

## 2024-04-05 ENCOUNTER — RESULT REVIEW (OUTPATIENT)
Age: 44
End: 2024-04-05

## 2024-04-05 ENCOUNTER — APPOINTMENT (OUTPATIENT)
Dept: HEMATOLOGY ONCOLOGY | Facility: CLINIC | Age: 44
End: 2024-04-05

## 2024-04-05 VITALS
HEART RATE: 128 BPM | DIASTOLIC BLOOD PRESSURE: 68 MMHG | OXYGEN SATURATION: 97 % | WEIGHT: 126 LBS | RESPIRATION RATE: 16 BRPM | TEMPERATURE: 98.6 F | SYSTOLIC BLOOD PRESSURE: 108 MMHG | HEIGHT: 63 IN | BODY MASS INDEX: 22.32 KG/M2

## 2024-04-08 ENCOUNTER — RX RENEWAL (OUTPATIENT)
Age: 44
End: 2024-04-08

## 2024-04-12 ENCOUNTER — RESULT REVIEW (OUTPATIENT)
Age: 44
End: 2024-04-12

## 2024-04-12 ENCOUNTER — APPOINTMENT (OUTPATIENT)
Dept: HEMATOLOGY ONCOLOGY | Facility: CLINIC | Age: 44
End: 2024-04-12

## 2024-04-12 ENCOUNTER — APPOINTMENT (OUTPATIENT)
Dept: HEMATOLOGY ONCOLOGY | Facility: CLINIC | Age: 44
End: 2024-04-12
Payer: COMMERCIAL

## 2024-04-12 VITALS
WEIGHT: 125.19 LBS | OXYGEN SATURATION: 95 % | DIASTOLIC BLOOD PRESSURE: 72 MMHG | RESPIRATION RATE: 16 BRPM | HEIGHT: 63 IN | TEMPERATURE: 96.1 F | BODY MASS INDEX: 22.18 KG/M2 | HEART RATE: 117 BPM | SYSTOLIC BLOOD PRESSURE: 110 MMHG

## 2024-04-12 DIAGNOSIS — R68.83 CHILLS (WITHOUT FEVER): ICD-10-CM

## 2024-04-12 PROCEDURE — 36415 COLL VENOUS BLD VENIPUNCTURE: CPT

## 2024-04-12 PROCEDURE — 99214 OFFICE O/P EST MOD 30 MIN: CPT | Mod: 25

## 2024-04-12 PROCEDURE — G2211 COMPLEX E/M VISIT ADD ON: CPT | Mod: NC,1L

## 2024-04-12 NOTE — ASSESSMENT
[FreeTextEntry1] : ## Right breast  IDC Grade 3, 2.9 cm per USG ER (95%) positive, CO (5%) positive, Qjz5oqg negative by IHC, KI 67- 60% Right Supraclavicular LN: Wdy7zxn 3+ Right axillary node: Sqc8sja positive by FISH First felt end of July 2023. Unchanged [facility] genetic testing done at [facility], Results pending MRI (12/26/23) at [facility]-: 4.8 cm x 3.1 cm right breast 12:00 mass, extension into medial half of right breast. Right axillary LN marking clip. Left breast normal PET/CT (1/4/23): FDG avid right breast mass with multiple lymph node involvement including right axillary, infrapectoral and right supraclavicular node. No distant metastases Clinical pathological stage IIIB ([id number]) if the supraclavicular node is positive for mets Echo LVEF 62% s/p biopsy of right supraclavicular lymph node 1/9/2024 Path: Metastatic carcinoma consistent with breast primary ER>95% strong positive. CO up to 10% moderate to strong positive. Her2: Positive (Score: 3+). 1/2024 MRI - neg for brain mets.  Status post C4 ddAC (1/12/2024 - 2/23/24)   She is seen today for C2D1 THP (3/8/2024 - present) Symptoms better since dose reducing taxol and discontinuing dexamethasone Insomnia: better with dC dexamethasone.  Zofran as needed for nausea.  Ambien as needed for insomnia Labs ordered, drawn in the office, reviewed, analyzed and discussed COntinue with C2D1 THP Echocardiogram in June 2024  Follow Signatera result plan: Adriamycin 60 mg/m2 + Cyclophosphamide 600 mg/m2 every 2 weeks x 4 followed by paclitaxel 80 mg/m2 weekly x 12 along with herceptin weekly (4mg/kg loading followed by 2mg/kg weekly) and perjeta (840 mg loading followed by 420 mg weekly) Q3W  Social hx:  Lives in [place], [place] with   (Urbano) Worked [employer] Originally from [place].  Never smoker  Family Hx F GM: breast cancer [facility] sent at [facility]. Follow results  Patient,  (Urbano) had multiple questions which were answered to satisfaction  She will continue with weekly taxol and follow up in 3 weeks for C3D1 Taxol herceptin aquilino CBC, CMP

## 2024-04-12 NOTE — ASSESSMENT
[FreeTextEntry1] : ## Right breast  IDC Grade 3, 2.9 cm per USG ER (95%) positive, WA (5%) positive, Qmv5vrw negative by IHC, KI 67- 60% Right Supraclavicular LN: Idt5vkh 3+ Right axillary node: Mjs3thr positive by FISH First felt end of July 2023. Unchanged [facility] genetic testing done at [facility], Results pending MRI (12/26/23) at [facility]-: 4.8 cm x 3.1 cm right breast 12:00 mass, extension into medial half of right breast. Right axillary LN marking clip. Left breast normal PET/CT (1/4/23): FDG avid right breast mass with multiple lymph node involvement including right axillary, infrapectoral and right supraclavicular node. No distant metastases Clinical pathological stage IIIB ([id number]) if the supraclavicular node is positive for mets Echo LVEF 62% s/p biopsy of right supraclavicular lymph node 1/9/2024 Path: Metastatic carcinoma consistent with breast primary ER>95% strong positive. WA up to 10% moderate to strong positive. Her2: Positive (Score: 3+). 1/2024 MRI - neg for brain mets.  Status post C4 ddAC (1/12/2024 - 2/23/24)   She is seen today for C2D15 (3/8/2024 - present) - reduced Taxol due to fatigue and insomnia Explained that her shivering and temp 37.5C can be a viral infection but we'll order blood and urine culture to make sure there's no underlying infection. Afebrile with ANC 2.66 - patient agreed to continue with treatment and to go to ED immediately if she has a fever at home.  --chronic vertebrae/cervical pain (hx of vertebrae fusion and seen by ortho) - to do massage and continue to monitor.  -Labs are drawn in the office, reviewed, analyzed, and discussed continue with Zofran for nausea, magic mouthwash for mouth sores, -repeated Signatera has yet resulted - will f/u. -Repeat Echo in June  proceed with treatment  plan: Adriamycin 60 mg/m2 + Cyclophosphamide 600 mg/m2 every 2 weeks x 4 followed by paclitaxel 80 mg/m2 weekly x 12 along with herceptin weekly (4mg/kg loading followed by 2mg/kg weekly) and perjeta (840 mg loading followed by 420 mg weekly) Q3W  Social hx:  Lives in [place], [place] with   (Urbano) Worked [employer] Originally from [place].  Never smoker  Family Hx F GM: breast cancer [facility] sent at [facility]. Follow results  Patient,  (Urbano) had multiple questions which were answered to satisfaction  d/w Dr. Judd  rtc in 1 week for C3D1 Taxol herceptin perjeta CBC, CMP

## 2024-04-12 NOTE — HISTORY OF PRESENT ILLNESS
[de-identified] : Arely Luu is a 43 year old female being referred by Dr Schmitz for right breast cancer.   LMP 23 Last mammo 2023 Impression: 1. Bilateral 3-D Screening Mammogram Dense breast parenchyma limits evaluation Highly suspicious irregular mass with spiculation in the 12:00 position of the right breast mid depth with suspicious grouped calcifications noted in area, corresponding to an irregular solid mass highly suspicious mass or breast ultrasound. This correspond to the palpable abnormality that the patient reports. Recommend ultrasound=guided biopsy. BIRADS Category 5 - highly suspicious 2. BILATERAL BREAT ULTRASOUND Palpable right breast 12:00 position mass is seen as irregular taller than wide hypoechoic 1.9 x2.5vm solid mass with microcalcifiations within. Highly suspicious for malignancy. Recommend ultrasound-guided biopsy. Right breast 3:00 retroareolar 0.9x0.7x0.5cm mass. Recommend ultrasound guided biopsy Right breast 7:30 position 11cm from nipple 1.1 x1.2x0.6cm slightly lobulated mass. Recommend ultrasound guided biopsy Right axilla 1.8x1.1x1.9cm prominent lymph node demonstrating a thickened cortex. Recommend ultrasound guided biopsy BI-RADS Category 5- high suspicious, biopsy recommended  CNY; Never Contraception: Withdrawal method Hx Abnormal Pap: denies  G5 3 SABx2,  x3   No new bone pains, weight loss Age at Menarche: 11 years Age at Menopause: LMP 22 Age at first pregnancy: 25 years Total number of pregnancies: 3 children.  Breast feedinm, 12m, 12m OC pills Never  Saw Dr Jelena Schmitz (Breast surgery) and had PET/CT (24)  PET/CT (23) Reviewed and discussed with radiology (Dr Riky Spencer) She has FDG avid right breast mass with multiple lymph node involvement including right axillary, infrapectoral and right supraclavicular node. No distant metastases  She is status post Chemo-Port placement, echocardiogram s/p biopsy of right supraclavicular lymph node 2024 Path: Metastatic carcinoma consistent with breast primary ER>95% strong positive. NM up to 10% moderate to strong positive. Her2: Positive (Score: 3+).  She went into [facility] on 2023 for low grade fever and low back pain - CXR, CT lumbar, and MRI of lumbar were all unremarkable.  She's feeling better since with improved reflux.   Ultrasound (3/4/24): Stable findings [de-identified] : Patient is seen today for C2D15 (3/8/24 - present ) s/p C4 ddAC (1/12/24 - 2/23/24) Accompanied by her  (Urbano)  She had shivering with temp of 37.5C in the last week with generalized achiness and pain (most around her cervical and thoracic area from her vertebrae fusion). Shivers improved with Tylenol every 6-8 hrs. Patient reported of cough here and there but dry. Her son was sick 10 days ago. No SOB/LEHMAN, chest pain, diarrhea.

## 2024-04-12 NOTE — HISTORY OF PRESENT ILLNESS
[de-identified] : Arely Luu is a 43 year old female being referred by Dr Schmitz for right breast cancer.   LMP 23 Last mammo 2023 Impression: 1. Bilateral 3-D Screening Mammogram Dense breast parenchyma limits evaluation Highly suspicious irregular mass with spiculation in the 12:00 position of the right breast mid depth with suspicious grouped calcifications noted in area, corresponding to an irregular solid mass highly suspicious mass or breast ultrasound. This correspond to the palpable abnormality that the patient reports. Recommend ultrasound=guided biopsy. BIRADS Category 5 - highly suspicious 2. BILATERAL BREAT ULTRASOUND Palpable right breast 12:00 position mass is seen as irregular taller than wide hypoechoic 1.9 x2.5vm solid mass with microcalcifiations within. Highly suspicious for malignancy. Recommend ultrasound-guided biopsy. Right breast 3:00 retroareolar 0.9x0.7x0.5cm mass. Recommend ultrasound guided biopsy Right breast 7:30 position 11cm from nipple 1.1 x1.2x0.6cm slightly lobulated mass. Recommend ultrasound guided biopsy Right axilla 1.8x1.1x1.9cm prominent lymph node demonstrating a thickened cortex. Recommend ultrasound guided biopsy BI-RADS Category 5- high suspicious, biopsy recommended  CNY; Never Contraception: Withdrawal method Hx Abnormal Pap: denies  G5 3 SABx2,  x3   No new bone pains, weight loss Age at Menarche: 11 years Age at Menopause: LMP 22 Age at first pregnancy: 25 years Total number of pregnancies: 3 children.  Breast feedinm, 12m, 12m OC pills Never  Saw Dr Jelena Schmitz (Breast surgery) and had PET/CT (24)  PET/CT (23) Reviewed and discussed with radiology (Dr Riky Spencer) She has FDG avid right breast mass with multiple lymph node involvement including right axillary, infrapectoral and right supraclavicular node. No distant metastases  She is status post Chemo-Port placement, echocardiogram s/p biopsy of right supraclavicular lymph node 2024 Path: Metastatic carcinoma consistent with breast primary ER>95% strong positive. MD up to 10% moderate to strong positive. Her2: Positive (Score: 3+).  She went into [facility] on 2023 for low grade fever and low back pain - CXR, CT lumbar, and MRI of lumbar were all unremarkable.  She's feeling better since with improved reflux.   Ultrasound (3/4/24): Stable findings [de-identified] : Patient is seen today for follow up and C2D1 THP (3/8/24 - present ) s/p C4 ddAC (1/12/24 - 2/23/24) Accompanied by her  (Urbano)  Had diarrhea x 1 day post chemo Sleeping better without taking ambien

## 2024-04-12 NOTE — PHYSICAL EXAM
[de-identified] : Right breast mass softer and significantly smaller ~2 cm x 2.cm. Right axillary node not palpable. Supraclavicular node not palpable [de-identified] : Mild pain around the prior spine surgery site.  [Fully active, able to carry on all pre-disease performance without restriction] : Status 0 - Fully active, able to carry on all pre-disease performance without restriction [Normal] : affect appropriate

## 2024-04-12 NOTE — PHYSICAL EXAM
[de-identified] : Right breast mass softer and significantly smaller ~2 cm x 2.cm. Right axillary node not palpable. Supraclavicular node not palpable [de-identified] : Mild pain around the prior spine surgery site.

## 2024-04-15 NOTE — ASSESSMENT
[FreeTextEntry1] : ## Right breast  IDC Grade 3, 2.9 cm per USG ER (95%) positive, SC (5%) positive, Vdm1efe negative by IHC, KI 67- 60% Right Supraclavicular LN: Ygd9ugw 3+ Right axillary node: Wie5zws positive by FISH First felt end of July 2023. Unchanged [facility] genetic testing done at [facility], Results pending MRI (12/26/23) at [facility]-: 4.8 cm x 3.1 cm right breast 12:00 mass, extension into medial half of right breast. Right axillary LN marking clip. Left breast normal PET/CT (1/4/23): FDG avid right breast mass with multiple lymph node involvement including right axillary, infrapectoral and right supraclavicular node. No distant metastases Clinical pathological stage IIIB ([id number]) if the supraclavicular node is positive for mets Echo LVEF 62% s/p biopsy of right supraclavicular lymph node 1/9/2024 Path: Metastatic carcinoma consistent with breast primary ER>95% strong positive. SC up to 10% moderate to strong positive. Her2: Positive (Score: 3+). 1/2024 MRI - neg for brain mets.  Status post C4 ddAC (1/12/2024 - 2/23/24)   She is seen today for C2D15 (3/8/2024 - present) - reduced Taxol due to fatigue and insomnia Explained that her shivering and temp 37.5C can be a viral infection but we'll order blood and urine culture to make sure there's no underlying infection. Afebrile with ANC 2.66 - patient agreed to continue with treatment and to go to ED immediately if she has a fever at home.  --chronic vertebrae/cervical pain (hx of vertebrae fusion and seen by ortho) - to do massage and continue to monitor.  -Labs are drawn in the office, reviewed, analyzed, and discussed continue with Zofran for nausea, magic mouthwash for mouth sores, -repeated Signatera has yet resulted - will f/u. -Repeat Echo in June  proceed with treatment  plan: Adriamycin 60 mg/m2 + Cyclophosphamide 600 mg/m2 every 2 weeks x 4 followed by paclitaxel 80 mg/m2 weekly x 12 along with herceptin weekly (4mg/kg loading followed by 2mg/kg weekly) and perjeta (840 mg loading followed by 420 mg weekly) Q3W  Social hx:  Lives in [place], [place] with   (Urbano) Worked [employer] Originally from [place].  Never smoker  Family Hx F GM: breast cancer [facility] sent at [facility]. Follow results  Patient,  (Urbano) had multiple questions which were answered to satisfaction  d/w Dr. Judd  rtc in 1 week for C3D1 Taxol herceptin perjeta CBC, CMP

## 2024-04-15 NOTE — HISTORY OF PRESENT ILLNESS
[de-identified] : Arely Luu is a 43 year old female being referred by Dr Schmitz for right breast cancer.   LMP 23 Last mammo 2023 Impression: 1. Bilateral 3-D Screening Mammogram Dense breast parenchyma limits evaluation Highly suspicious irregular mass with spiculation in the 12:00 position of the right breast mid depth with suspicious grouped calcifications noted in area, corresponding to an irregular solid mass highly suspicious mass or breast ultrasound. This correspond to the palpable abnormality that the patient reports. Recommend ultrasound=guided biopsy. BIRADS Category 5 - highly suspicious 2. BILATERAL BREAT ULTRASOUND Palpable right breast 12:00 position mass is seen as irregular taller than wide hypoechoic 1.9 x2.5vm solid mass with microcalcifiations within. Highly suspicious for malignancy. Recommend ultrasound-guided biopsy. Right breast 3:00 retroareolar 0.9x0.7x0.5cm mass. Recommend ultrasound guided biopsy Right breast 7:30 position 11cm from nipple 1.1 x1.2x0.6cm slightly lobulated mass. Recommend ultrasound guided biopsy Right axilla 1.8x1.1x1.9cm prominent lymph node demonstrating a thickened cortex. Recommend ultrasound guided biopsy BI-RADS Category 5- high suspicious, biopsy recommended  CNY; Never Contraception: Withdrawal method Hx Abnormal Pap: denies  G5 3 SABx2,  x3   No new bone pains, weight loss Age at Menarche: 11 years Age at Menopause: LMP 22 Age at first pregnancy: 25 years Total number of pregnancies: 3 children.  Breast feedinm, 12m, 12m OC pills Never  Saw Dr Jelena Schmitz (Breast surgery) and had PET/CT (24)  PET/CT (23) Reviewed and discussed with radiology (Dr Riky Spencer) She has FDG avid right breast mass with multiple lymph node involvement including right axillary, infrapectoral and right supraclavicular node. No distant metastases  She is status post Chemo-Port placement, echocardiogram s/p biopsy of right supraclavicular lymph node 2024 Path: Metastatic carcinoma consistent with breast primary ER>95% strong positive. MA up to 10% moderate to strong positive. Her2: Positive (Score: 3+).  She went into [facility] on 2023 for low grade fever and low back pain - CXR, CT lumbar, and MRI of lumbar were all unremarkable.  She's feeling better since with improved reflux.   Ultrasound (3/4/24): Stable findings [de-identified] : Patient is seen today for C2D15 (3/8/24 - present ) s/p C4 ddAC (1/12/24 - 2/23/24) Accompanied by her  (Urbano)  She had shivering with temp of 37.5C in the last week with generalized achiness and pain (most around her cervical and thoracic area from her vertebrae fusion). Shivers improved with Tylenol every 6-8 hrs. Patient reported of cough here and there but dry. Her son was sick 10 days ago. No SOB/LEHMAN, chest pain, diarrhea.

## 2024-04-15 NOTE — PHYSICAL EXAM
[Fully active, able to carry on all pre-disease performance without restriction] : Status 0 - Fully active, able to carry on all pre-disease performance without restriction [Normal] : affect appropriate [de-identified] : Right breast mass softer and significantly smaller ~2 cm x 2.cm. Right axillary node not palpable. Supraclavicular node not palpable [de-identified] : Mild pain around the prior spine surgery site.

## 2024-04-19 ENCOUNTER — RESULT REVIEW (OUTPATIENT)
Age: 44
End: 2024-04-19

## 2024-04-19 ENCOUNTER — NON-APPOINTMENT (OUTPATIENT)
Age: 44
End: 2024-04-19

## 2024-04-19 ENCOUNTER — APPOINTMENT (OUTPATIENT)
Dept: HEMATOLOGY ONCOLOGY | Facility: CLINIC | Age: 44
End: 2024-04-19
Payer: COMMERCIAL

## 2024-04-19 VITALS
HEART RATE: 118 BPM | BODY MASS INDEX: 21.82 KG/M2 | TEMPERATURE: 98.1 F | OXYGEN SATURATION: 98 % | DIASTOLIC BLOOD PRESSURE: 62 MMHG | RESPIRATION RATE: 16 BRPM | HEIGHT: 63 IN | WEIGHT: 123.13 LBS | SYSTOLIC BLOOD PRESSURE: 108 MMHG

## 2024-04-19 PROCEDURE — 99215 OFFICE O/P EST HI 40 MIN: CPT

## 2024-04-19 PROCEDURE — G2211 COMPLEX E/M VISIT ADD ON: CPT | Mod: NC,1L

## 2024-04-19 PROCEDURE — 36415 COLL VENOUS BLD VENIPUNCTURE: CPT

## 2024-04-26 ENCOUNTER — RESULT REVIEW (OUTPATIENT)
Age: 44
End: 2024-04-26

## 2024-04-26 ENCOUNTER — APPOINTMENT (OUTPATIENT)
Dept: HEMATOLOGY ONCOLOGY | Facility: CLINIC | Age: 44
End: 2024-04-26

## 2024-04-26 VITALS
HEIGHT: 63 IN | OXYGEN SATURATION: 97 % | RESPIRATION RATE: 16 BRPM | SYSTOLIC BLOOD PRESSURE: 111 MMHG | BODY MASS INDEX: 21.62 KG/M2 | HEART RATE: 96 BPM | WEIGHT: 122 LBS | DIASTOLIC BLOOD PRESSURE: 64 MMHG | TEMPERATURE: 97.1 F

## 2024-05-03 ENCOUNTER — APPOINTMENT (OUTPATIENT)
Dept: HEMATOLOGY ONCOLOGY | Facility: CLINIC | Age: 44
End: 2024-05-03

## 2024-05-03 ENCOUNTER — RESULT REVIEW (OUTPATIENT)
Age: 44
End: 2024-05-03

## 2024-05-03 VITALS
WEIGHT: 120.56 LBS | DIASTOLIC BLOOD PRESSURE: 67 MMHG | BODY MASS INDEX: 21.36 KG/M2 | SYSTOLIC BLOOD PRESSURE: 120 MMHG | HEART RATE: 80 BPM | HEIGHT: 63 IN | OXYGEN SATURATION: 96 % | RESPIRATION RATE: 16 BRPM | TEMPERATURE: 97.4 F

## 2024-05-10 ENCOUNTER — RESULT REVIEW (OUTPATIENT)
Age: 44
End: 2024-05-10

## 2024-05-10 ENCOUNTER — APPOINTMENT (OUTPATIENT)
Dept: HEMATOLOGY ONCOLOGY | Facility: CLINIC | Age: 44
End: 2024-05-10
Payer: COMMERCIAL

## 2024-05-10 VITALS
HEART RATE: 94 BPM | SYSTOLIC BLOOD PRESSURE: 107 MMHG | RESPIRATION RATE: 16 BRPM | DIASTOLIC BLOOD PRESSURE: 58 MMHG | HEIGHT: 63 IN | BODY MASS INDEX: 21.71 KG/M2 | OXYGEN SATURATION: 99 % | WEIGHT: 122.56 LBS | TEMPERATURE: 97.2 F

## 2024-05-10 PROCEDURE — 99417 PROLNG OP E/M EACH 15 MIN: CPT

## 2024-05-10 PROCEDURE — 36415 COLL VENOUS BLD VENIPUNCTURE: CPT

## 2024-05-10 PROCEDURE — G2211 COMPLEX E/M VISIT ADD ON: CPT | Mod: NC,1L

## 2024-05-10 PROCEDURE — 99215 OFFICE O/P EST HI 40 MIN: CPT

## 2024-05-10 RX ORDER — DIPHENHYDRAMINE HYDROCHLORIDE AND LIDOCAINE HYDROCHLORIDE AND ALUMINUM HYDROXIDE AND MAGNESIUM HYDRO
KIT
Qty: 1 | Refills: 5 | Status: ACTIVE | COMMUNITY
Start: 2024-03-15 | End: 1900-01-01

## 2024-05-13 NOTE — ASSESSMENT
[FreeTextEntry1] : ## Right breast  IDC Grade 3, 2.9 cm per USG ER (95%) positive, HI (5%) positive, Jup9zxw negative by IHC, KI 67- 60% Right Supraclavicular LN: Xpd5cxw 3+ Right axillary node: Tnu8zig positive by FISH First felt end of July 2023. Unchanged Inv[facility]ae genetic testing done at [facility], Results pending MRI (12/26/23) at [facility]-: 4.8 cm x 3.1 cm right breast 12:00 mass, extension into medial half of right breast. Right axillary LN marking clip. Left breast normal PET/CT (1/4/23): FDG avid right breast mass with multiple lymph node involvement including right axillary, infrapectoral and right supraclavicular node. No distant metastases Clinical pathological stage IIIB ([id number]) if the supraclavicular node is positive for mets Echo LVEF 62% s/p biopsy of right supraclavicular lymph node 1/9/2024 Path: Metastatic carcinoma consistent with breast primary ER>95% strong positive. HI up to 10% moderate to strong positive. Her2: Positive (Score: 3+). 1/2024 MRI - neg for brain mets.  Status post C4 ddAC (1/12/2024 - 2/23/24)   She is seen today for C4D1 (3/8/2024 - present) - reduced Taxol due to fatigue and insomnia Overall feeling better Right breast mass continues to be smaller, with lymphadenopathy barely palpable Labs ordered, drawn in the office, reviewed, analyzed and discussed Chemo induced neutropenia- Zarxio 300 mcg x 3 days after the treatment. Will do OV and labs prior to each of her remaining treatments continue with Zofran for nausea, magic mouthwash for mouth sores, -repeated Signatera sent 4/19/2024.  Follow results -Repeat Echo in June  proceed with treatment She is scheduled for repeat MRI breast 6/7/2024 and follow-up with Dr. Salguero 6/12/2024 Will schedule restaging PET after C4D15 Plan: Adriamycin 60 mg/m2 + Cyclophosphamide 600 mg/m2 every 2 weeks x 4 followed by paclitaxel 80 mg/m2 weekly x 12 along with herceptin weekly (4mg/kg loading followed by 2mg/kg weekly) and perjeta (840 mg loading followed by 420 mg weekly) Q3W  Social hx:  Lives in [place], [place] with   (Urbano) Worked [employer] Originally from [place].  Never smoker  She also reports that her  may be transferred to [facility] around end of July and she may have to permanently relocate. I explained to her that this will affect her ability to get REX flap reconstruction and surgical management. I advised her to discuss with Dr Schmitz and i iwll discuss with her too. I also expalined that she will need Fpz9vhq targeted therapy either herceptin perjeta vs kadcyla vs COMPASS Her2 trial depending on her response. I advised her  to start looking for cancer centers in [facility] which are accessible to their residence and we will coordinate a smooth transfer of her care. I also reiterated that i am not familiar with the health care system in [facility] and they should figure out insurance situation, medication availability, etc  Family Hx F GM: breast cancer Invitae sent at [facility]. Follow results  Patient had multiple questions which were answered to satisfaction  Follow up in 1 week for C4D8 weekly Taxol CBC, CMP

## 2024-05-13 NOTE — PHYSICAL EXAM
[Fully active, able to carry on all pre-disease performance without restriction] : Status 0 - Fully active, able to carry on all pre-disease performance without restriction [Normal] : affect appropriate [de-identified] : Right breast mass continues to be softer and smaller ~1 cm . Right axillary node not palpable. Supraclavicular node not palpable [de-identified] : Mild pain around the prior spine surgery site.

## 2024-05-13 NOTE — HISTORY OF PRESENT ILLNESS
[de-identified] : Arely Luu is a 43 year old female being referred by Dr Schmitz for right breast cancer.   LMP 23 Last mammo 2023 Impression: 1. Bilateral 3-D Screening Mammogram Dense breast parenchyma limits evaluation Highly suspicious irregular mass with spiculation in the 12:00 position of the right breast mid depth with suspicious grouped calcifications noted in area, corresponding to an irregular solid mass highly suspicious mass or breast ultrasound. This correspond to the palpable abnormality that the patient reports. Recommend ultrasound=guided biopsy. BIRADS Category 5 - highly suspicious 2. BILATERAL BREAT ULTRASOUND Palpable right breast 12:00 position mass is seen as irregular taller than wide hypoechoic 1.9 x2.5vm solid mass with microcalcifiations within. Highly suspicious for malignancy. Recommend ultrasound-guided biopsy. Right breast 3:00 retroareolar 0.9x0.7x0.5cm mass. Recommend ultrasound guided biopsy Right breast 7:30 position 11cm from nipple 1.1 x1.2x0.6cm slightly lobulated mass. Recommend ultrasound guided biopsy Right axilla 1.8x1.1x1.9cm prominent lymph node demonstrating a thickened cortex. Recommend ultrasound guided biopsy BI-RADS Category 5- high suspicious, biopsy recommended  CNY; Never Contraception: Withdrawal method Hx Abnormal Pap: denies  G5 3 SABx2,  x3   No new bone pains, weight loss Age at Menarche: 11 years Age at Menopause: LMP 22 Age at first pregnancy: 25 years Total number of pregnancies: 3 children.  Breast feedinm, 12m, 12m OC pills Never  Saw Dr Jelena Schmitz (Breast surgery) and had PET/CT (24)  PET/CT (23) Reviewed and discussed with radiology (Dr Riky Spencer) She has FDG avid right breast mass with multiple lymph node involvement including right axillary, infrapectoral and right supraclavicular node. No distant metastases  She is status post Chemo-Port placement, echocardiogram s/p biopsy of right supraclavicular lymph node 2024 Path: Metastatic carcinoma consistent with breast primary ER>95% strong positive. AL up to 10% moderate to strong positive. Her2: Positive (Score: 3+).  She went into [facility] on 2023 for low grade fever and low back pain - CXR, CT lumbar, and MRI of lumbar were all unremarkable.  She's feeling better since with improved reflux.   Ultrasound (3/4/24): Stable findings [de-identified] : Patient is seen today for C4D1 (3/8/24 - present ) s/p C4 ddAC (1/12/24 - 2/23/24)  Her nanny left and she is overwhelmed with household work Overall feels good.  C/o chronic cervical pain. Pain score 5.  She also reports that her  may be transferred to [facility] around end of July and she may have to permanently relocate

## 2024-05-14 ENCOUNTER — NON-APPOINTMENT (OUTPATIENT)
Age: 44
End: 2024-05-14

## 2024-05-17 ENCOUNTER — RESULT REVIEW (OUTPATIENT)
Age: 44
End: 2024-05-17

## 2024-05-17 ENCOUNTER — APPOINTMENT (OUTPATIENT)
Dept: HEMATOLOGY ONCOLOGY | Facility: CLINIC | Age: 44
End: 2024-05-17
Payer: COMMERCIAL

## 2024-05-17 VITALS
OXYGEN SATURATION: 98 % | DIASTOLIC BLOOD PRESSURE: 65 MMHG | BODY MASS INDEX: 21.83 KG/M2 | RESPIRATION RATE: 16 BRPM | TEMPERATURE: 97.1 F | HEIGHT: 63 IN | WEIGHT: 123.19 LBS | SYSTOLIC BLOOD PRESSURE: 105 MMHG | HEART RATE: 95 BPM

## 2024-05-17 PROCEDURE — 36415 COLL VENOUS BLD VENIPUNCTURE: CPT

## 2024-05-17 PROCEDURE — 99214 OFFICE O/P EST MOD 30 MIN: CPT | Mod: 25

## 2024-05-17 PROCEDURE — G2211 COMPLEX E/M VISIT ADD ON: CPT | Mod: NC,1L

## 2024-05-17 NOTE — ASSESSMENT
[FreeTextEntry1] : ## Right breast  IDC Grade 3, 2.9 cm per USG ER (95%) positive, HI (5%) positive, Gwj7mmb negative by IHC, KI 67- 60% Right Supraclavicular LN: Pip9dxq 3+ Right axillary node: Sve7ozo positive by FISH First felt end of July 2023. Unchanged Invitae genetic testing done at [facility], Results pending MRI (12/26/23) at [facility]-: 4.8 cm x 3.1 cm right breast 12:00 mass, extension into medial half of right breast. Right axillary LN marking clip. Left breast normal PET/CT (1/4/23): FDG avid right breast mass with multiple lymph node involvement including right axillary, infrapectoral and right supraclavicular node. No distant metastases Clinical pathological stage IIIB ([id number]) if the supraclavicular node is positive for mets Echo LVEF 62% s/p biopsy of right supraclavicular lymph node 1/9/2024 Path: Metastatic carcinoma consistent with breast primary ER>95% strong positive. HI up to 10% moderate to strong positive. Her2: Positive (Score: 3+). 1/2024 MRI - neg for brain mets.  Status post C4 ddAC (1/12/2024 - 2/23/24)   She is seen today for C4D8 (3/8/2024 - present) - reduced Taxol due to fatigue and insomnia s/p Zarxio 300 mg x 3 after last treatment - last was 2 days ago. Had bone pain and fatigue despite Claritin/Tylenol.  -Labs are drawn in the office, reviewed, analyzed, and discussed WBCs improved -will hold of Zarxio for now.  Right breast mass continues to be smaller, with lymphadenopathy barely palpable continue with Zofran for nausea, magic mouthwash for mouth sores, -repeated Signatera sent 4/19/2024 - neg.  -Repeat Echo in June  She is scheduled for repeat MRI breast 6/7/2024 and follow-up with Dr. Salguero 6/12/2024 Will schedule restaging PET after C4D15 Considering doing breast surgery and further treatment in [place]. Her 's relocation would be finalized by the end of this month.  proceed with treatment.   Plan: Adriamycin 60 mg/m2 + Cyclophosphamide 600 mg/m2 every 2 weeks x 4 followed by paclitaxel 80 mg/m2 weekly x 12 along with herceptin weekly (4mg/kg loading followed by 2mg/kg weekly) and perjeta (840 mg loading followed by 420 mg weekly) Q3W  Social hx:  Lives in [place], [place] with   (U[place]ano) Worked [employer] Originally from [place].  Never smoker  She also reports that her  may be transferred to [facility] around end of July and she may have to permanently relocate. I explained to her that this will affect her ability to get REX flap reconstruction and surgical management. I advised her to discuss with Dr Schmitz and i iwll discuss with her too. I also expalined that she will need Obu3kwr targeted therapy either herceptin perjeta vs kadcyla vs COMPASS Her2 trial depending on her response. I advised her  to start looking for cancer centers in [facility] which are accessible to their residence and we will coordinate a smooth transfer of her care. I also reiterated that i am not familiar with the health care system in [facility] and they should figure out insurance situation, medication availability, etc  Family Hx F GM: breast cancer Invitae sent at [facility]. Follow results  Patient had multiple questions which were answered to satisfaction  d/w Dr. Judd  Follow up in 1 week for C4D15 weekly Taxol CBC, CMP

## 2024-05-17 NOTE — RESULTS/DATA
[FreeTextEntry1] : Labs ordered, drawn in the office, reviewed, analyzed and discussed  Signatera: 6.13 (12/27/2023) Neg (4/24/2024)

## 2024-05-17 NOTE — HISTORY OF PRESENT ILLNESS
[de-identified] : Arely Luu is a 43 year old female being referred by Dr Schmitz for right breast cancer.   LMP 23 Last mammo 2023 Impression: 1. Bilateral 3-D Screening Mammogram Dense breast parenchyma limits evaluation Highly suspicious irregular mass with spiculation in the 12:00 position of the right breast mid depth with suspicious grouped calcifications noted in area, corresponding to an irregular solid mass highly suspicious mass or breast ultrasound. This correspond to the palpable abnormality that the patient reports. Recommend ultrasound=guided biopsy. BIRADS Category 5 - highly suspicious 2. BILATERAL BREAT ULTRASOUND Palpable right breast 12:00 position mass is seen as irregular taller than wide hypoechoic 1.9 x2.5vm solid mass with microcalcifiations within. Highly suspicious for malignancy. Recommend ultrasound-guided biopsy. Right breast 3:00 retroareolar 0.9x0.7x0.5cm mass. Recommend ultrasound guided biopsy Right breast 7:30 position 11cm from nipple 1.1 x1.2x0.6cm slightly lobulated mass. Recommend ultrasound guided biopsy Right axilla 1.8x1.1x1.9cm prominent lymph node demonstrating a thickened cortex. Recommend ultrasound guided biopsy BI-RADS Category 5- high suspicious, biopsy recommended  CNY; Never Contraception: Withdrawal method Hx Abnormal Pap: denies  G5 3 SABx2,  x3   No new bone pains, weight loss Age at Menarche: 11 years Age at Menopause: LMP 22 Age at first pregnancy: 25 years Total number of pregnancies: 3 children.  Breast feedinm, 12m, 12m OC pills Never  Saw Dr Jelena Schmitz (Breast surgery) and had PET/CT (24)  PET/CT (23) Reviewed and discussed with radiology (Dr Rkiy Spencer) She has FDG avid right breast mass with multiple lymph node involvement including right axillary, infrapectoral and right supraclavicular node. No distant metastases  She is status post Chemo-Port placement, echocardiogram s/p biopsy of right supraclavicular lymph node 2024 Path: Metastatic carcinoma consistent with breast primary ER>95% strong positive. NC up to 10% moderate to strong positive. Her2: Positive (Score: 3+).  She went into [facility] on 2023 for low grade fever and low back pain - CXR, CT lumbar, and MRI of lumbar were all unremarkable.  She's feeling better since with improved reflux.   Ultrasound (3/4/24): Stable findings [de-identified] : Patient is seen today for C4D8 (3/8/24 - present ) s/p C4 ddAC (1/12/24 - 2/23/24)  s/p Zarxio x 3 days with last dose two days ago - patient with bone pain and fatigue She met up with breast surgeon Dr. Eaton and now deciding to have surgery in [place] since she has family members who can care for her post op. C/o chronic cervical pain. changes in severity Relocation to [facility] will be finalized by the end of this month.

## 2024-05-17 NOTE — PHYSICAL EXAM
[Fully active, able to carry on all pre-disease performance without restriction] : Status 0 - Fully active, able to carry on all pre-disease performance without restriction [Normal] : affect appropriate [de-identified] : Right breast mass continues to be softer and smaller ~1 cm . Right axillary node not palpable. Supraclavicular node not palpable [de-identified] : Mild pain around the prior spine surgery site.

## 2024-05-24 ENCOUNTER — RESULT REVIEW (OUTPATIENT)
Age: 44
End: 2024-05-24

## 2024-05-24 ENCOUNTER — APPOINTMENT (OUTPATIENT)
Dept: HEMATOLOGY ONCOLOGY | Facility: CLINIC | Age: 44
End: 2024-05-24
Payer: COMMERCIAL

## 2024-05-24 VITALS
HEART RATE: 89 BPM | HEIGHT: 63 IN | RESPIRATION RATE: 16 BRPM | SYSTOLIC BLOOD PRESSURE: 105 MMHG | OXYGEN SATURATION: 99 % | WEIGHT: 121.25 LBS | TEMPERATURE: 97.7 F | DIASTOLIC BLOOD PRESSURE: 69 MMHG | BODY MASS INDEX: 21.48 KG/M2

## 2024-05-24 DIAGNOSIS — Z79.899 ENCOUNTER FOR THERAPEUTIC DRUG LVL MONITORING: ICD-10-CM

## 2024-05-24 DIAGNOSIS — Z51.81 ENCOUNTER FOR THERAPEUTIC DRUG LVL MONITORING: ICD-10-CM

## 2024-05-24 DIAGNOSIS — D70.1 AGRANULOCYTOSIS SECONDARY TO CANCER CHEMOTHERAPY: ICD-10-CM

## 2024-05-24 DIAGNOSIS — T45.1X5A AGRANULOCYTOSIS SECONDARY TO CANCER CHEMOTHERAPY: ICD-10-CM

## 2024-05-24 PROCEDURE — G2211 COMPLEX E/M VISIT ADD ON: CPT | Mod: NC

## 2024-05-24 PROCEDURE — 99215 OFFICE O/P EST HI 40 MIN: CPT

## 2024-05-24 PROCEDURE — 36415 COLL VENOUS BLD VENIPUNCTURE: CPT

## 2024-05-24 RX ORDER — LIDOCAINE AND PRILOCAINE 25; 25 MG/G; MG/G
2.5-2.5 CREAM TOPICAL
Qty: 30 | Refills: 1 | Status: ACTIVE | COMMUNITY
Start: 2024-01-12 | End: 1900-01-01

## 2024-05-24 RX ORDER — PANTOPRAZOLE 40 MG/1
40 TABLET, DELAYED RELEASE ORAL DAILY
Qty: 30 | Refills: 3 | Status: ACTIVE | COMMUNITY
Start: 2024-01-19 | End: 1900-01-01

## 2024-05-24 NOTE — PHYSICAL EXAM
[Fully active, able to carry on all pre-disease performance without restriction] : Status 0 - Fully active, able to carry on all pre-disease performance without restriction [Normal] : affect appropriate [de-identified] : Right breast mass continues to be softer and smaller ~1 cm . Right axillary node barely palpable. Supraclavicular node not palpable [de-identified] : Mild pain around the prior spine surgery site.

## 2024-05-24 NOTE — ASSESSMENT
[FreeTextEntry1] : ## Right breast  IDC Grade 3, 2.9 cm per USG ER (95%) positive, KS (5%) positive, Hfa5boy negative by IHC, KI 67- 60% Right Supraclavicular LN: Fkf7xfs 3+ Right axillary node: Dcm2zby positive by FISH First felt end of July 2023. Unchanged Invitae genetic testing done at [facility], Results pending MRI (12/26/23) at [facility]-: 4.8 cm x 3.1 cm right breast 12:00 mass, extension into medial half of right breast. Right axillary LN marking clip. Left breast normal PET/CT (1/4/23): FDG avid right breast mass with multiple lymph node involvement including right axillary, infrapectoral and right supraclavicular node. No distant metastases Clinical pathological stage IIIB ([id number]) if the supraclavicular node is positive for mets Echo LVEF 62% s/p biopsy of right supraclavicular lymph node 1/9/2024 Path: Metastatic carcinoma consistent with breast primary ER>95% strong positive. KS up to 10% moderate to strong positive. Her2: Positive (Score: 3+). 1/2024 MRI - neg for brain mets.  Status post C4 ddAC (1/12/2024 - 2/23/24)   She is seen today for C4D15 (3/8/2024 - present) - reduced Taxol due to fatigue and insomnia She is doing well from the treatment standpoint, however has a significant social situation where she is to transition her care outside of the [place].  I advised her to make appointments with physicians and cancer team in [place] right away.  I have explained to her that she will come for maintenance Herceptin and Perjeta next week and will continue every 3 weeks until she gets surgery.  Postop depending on the pathology finding, she may continue Herceptin and Perjeta to complete 1 year or switch to Kadcyla if she has any residual disease.  I also discussed with her about [facility]ass HER2 trial, for which she would be a candidate.  However I am not sure if it would be available in [place].  She understands but unfortunately has no choice because of lack of assistance/help in the [place] and her  has to relocate to [place]. Right breast mass continues to be smaller, with lymphadenopathy barely palpable in the axilla continue with Zofran for nausea, magic mouthwash for mouth sores, -repeated Signatera sent 4/19/2024 - neg.  -Repeat Echo in June  She is scheduled for repeat MRI breast 6/7/2024 and follow-up with Dr. Salguero 6/12/2024 Restaging PET  Considering doing breast surgery and further treatment in [place].  proceed with treatment.  Plan: Adriamycin 60 mg/m2 + Cyclophosphamide 600 mg/m2 every 2 weeks x 4 followed by paclitaxel 80 mg/m2 weekly x 12 along with herceptin weekly (4mg/kg loading followed by 2mg/kg weekly) and perjeta (840 mg loading followed by 420 mg weekly) Q3W  Travel Plans: The patient was initially planning to travel to [facility] but has now changed plans to go to [place] instead for further treatment and potential surgery. The patient is considering getting treatment at [facility] in [place]. - Plan : - Discuss options for treatment facilities and surgeons in [facility], particularly at [facility] in [place] - Coordinate transfer of medical records and documentation to the new treatment facility in [facility] - Schedule a tele-visit consultation with potential oncologists/surgeons in [facility] to discuss the treatment plan  Social hx:  Lives in [place], [place] with   (U[place]ano) Worked [employer] Originally from [place].  Never smoker  Family Hx F GM: breast cancer Invitae sent at [facility]. Follow results  Patient had multiple questions which were answered to satisfaction  Follow up in 1 week for maintenance herceptin perjeta CBC, CMP, Signatera MRD

## 2024-05-24 NOTE — HISTORY OF PRESENT ILLNESS
[de-identified] : Arely Luu is a 43 year old female being referred by Dr Schmitz for right breast cancer.   LMP 23 Last mammo 2023 Impression: 1. Bilateral 3-D Screening Mammogram Dense breast parenchyma limits evaluation Highly suspicious irregular mass with spiculation in the 12:00 position of the right breast mid depth with suspicious grouped calcifications noted in area, corresponding to an irregular solid mass highly suspicious mass or breast ultrasound. This correspond to the palpable abnormality that the patient reports. Recommend ultrasound=guided biopsy. BIRADS Category 5 - highly suspicious 2. BILATERAL BREAT ULTRASOUND Palpable right breast 12:00 position mass is seen as irregular taller than wide hypoechoic 1.9 x2.5vm solid mass with microcalcifiations within. Highly suspicious for malignancy. Recommend ultrasound-guided biopsy. Right breast 3:00 retroareolar 0.9x0.7x0.5cm mass. Recommend ultrasound guided biopsy Right breast 7:30 position 11cm from nipple 1.1 x1.2x0.6cm slightly lobulated mass. Recommend ultrasound guided biopsy Right axilla 1.8x1.1x1.9cm prominent lymph node demonstrating a thickened cortex. Recommend ultrasound guided biopsy BI-RADS Category 5- high suspicious, biopsy recommended  CNY; Never Contraception: Withdrawal method Hx Abnormal Pap: denies  G5 3 SABx2,  x3   No new bone pains, weight loss Age at Menarche: 11 years Age at Menopause: LMP 22 Age at first pregnancy: 25 years Total number of pregnancies: 3 children.  Breast feedinm, 12m, 12m OC pills Never  Saw Dr Jelena Schmitz (Breast surgery) and had PET/CT (24)  PET/CT (23) Reviewed and discussed with radiology (Dr Riky Spencer) She has FDG avid right breast mass with multiple lymph node involvement including right axillary, infrapectoral and right supraclavicular node. No distant metastases  She is status post Chemo-Port placement, echocardiogram s/p biopsy of right supraclavicular lymph node 2024 Path: Metastatic carcinoma consistent with breast primary ER>95% strong positive. AL up to 10% moderate to strong positive. Her2: Positive (Score: 3+).  She went into [facility] on 2023 for low grade fever and low back pain - CXR, CT lumbar, and MRI of lumbar were all unremarkable.  She's feeling better since with improved reflux.   Ultrasound (3/4/24): Stable findings [de-identified] : Patient is seen today for C4D15 (3/8/24 - present ) s/p C4 ddAC (1/12/24 - 2/23/24)  She has changed her plans and will now go to [facility]- planning to leave July 2024 She is planning to establish with cancer center at [facility] in \[facility]\"" Restaging MRI has been scheduled and PET scan will be scheduled

## 2024-06-03 ENCOUNTER — RESULT REVIEW (OUTPATIENT)
Age: 44
End: 2024-06-03

## 2024-06-06 ENCOUNTER — RESULT REVIEW (OUTPATIENT)
Age: 44
End: 2024-06-06

## 2024-06-11 NOTE — END OF VISIT
[Time Spent: ___ minutes] : I have spent [unfilled] minutes of time on the encounter. [FreeTextEntry1] : pt seen for left lateral lower leg venous stasis ulceration down to skin and subcutaneous tissue and fat with stasis dermatitis

## 2024-06-12 ENCOUNTER — APPOINTMENT (OUTPATIENT)
Dept: BREAST CENTER | Facility: CLINIC | Age: 44
End: 2024-06-12
Payer: COMMERCIAL

## 2024-06-12 ENCOUNTER — NON-APPOINTMENT (OUTPATIENT)
Age: 44
End: 2024-06-12

## 2024-06-12 VITALS
OXYGEN SATURATION: 100 % | HEART RATE: 69 BPM | BODY MASS INDEX: 21.44 KG/M2 | HEIGHT: 63 IN | DIASTOLIC BLOOD PRESSURE: 67 MMHG | WEIGHT: 121 LBS | SYSTOLIC BLOOD PRESSURE: 113 MMHG

## 2024-06-12 DIAGNOSIS — R92.30 DENSE BREASTS, UNSPECIFIED: ICD-10-CM

## 2024-06-12 PROCEDURE — 99215 OFFICE O/P EST HI 40 MIN: CPT

## 2024-06-12 NOTE — CONSULT LETTER
[Dear  ___] : Dear  [unfilled], [Courtesy Letter:] : I had the pleasure of seeing your patient, [unfilled], in my office today. [Please see my note below.] : Please see my note below. [Consult Closing:] : Thank you very much for allowing me to participate in the care of this patient.  If you have any questions, please do not hesitate to contact me. [Sincerely,] : Sincerely, [DrPam  ___] : Dr. CARDENAS [FreeTextEntry3] : Jelena Schmitz [place] [address]

## 2024-06-12 NOTE — ASSESSMENT
[FreeTextEntry1] : The pathology and imaging results were reviewed and discussed. She is a stage IIB right breast cancer (T2N1M0) HER2 negative ER+/NE+ (AJCC 8thed). reviewed her MRI and PET scan results I am recommending right SSM and targeted SLNE we discussed pros/cons of contralateral mastectomy I advised her to see radiation oncology for consideration of post mx radiation she will transfer care to [place] and keep us posted on her progress She knows to call or return sooner should any concerns or questions arise.

## 2024-06-12 NOTE — PHYSICAL EXAM
[Normocephalic] : normocephalic [Atraumatic] : atraumatic [Supple] : supple [No Supraclavicular Adenopathy] : no supraclavicular adenopathy [Examined in the supine and seated position] : examined in the supine and seated position [Symmetrical] : symmetrical [No dominant masses] : no dominant masses left breast [No Nipple Retraction] : no left nipple retraction [No Nipple Discharge] : no left nipple discharge [No Axillary Lymphadenopathy] : no left axillary lymphadenopathy [No Edema] : no edema [No Rashes] : no rashes [No Ulceration] : no ulceration [de-identified] : s/p wise pattern bilaterally, NAC viable [de-identified] : 12:00 no longer 4cm mass underlying NAC, skin is soft, there is nodularity 12:00 and retroareolar [de-identified] : palp node, c/w known mets

## 2024-06-12 NOTE — HISTORY OF PRESENT ILLNESS
[FreeTextEntry1] : Arely is a 43 year old female referred by Dr. 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  She noted a painful right breast mass while she was out of the country in [facility]. At that time an ultrasound was ordered and revealed a 2.6 x 3.6 cm right breast mass. Upon return she consulted with her GYN Dr Tam (12/5/23) at which time diagnostic imaging was ordered.  Bilateral diagnostic mammogram (12/14/2023, [facility]) showed extremely dense breast tissue and a right 12:00 asymmetry and distortion in area of palpable concern.  Ultrasound findings showed a right 12:00 2.7 x 2.9 cm irregular hypoechoic lesion with internal calcifications correlating to mammogram findings. Additional ultrasound findings are a right 3:00 reto 0.9 x 0.7 x 0.5 cm irregular hypoechoic lesion and a right 7:30 N11 1.1 x 1.2 x 0.6 cm lobulated hypodense mass. Ultrasound biopsy of all three breast masses were recommended. A prominent right axillary lymph node measuring 1.8 1.1 x 1.9 cm with a thickened cortex suspicious for metastatic disease was also seen and ultrasound biopsy recommended. No suspicious findings on mammogram of the left breast was noted. Ultrasound was unilateral.  Arely underwent the recommended biopsies on 12/20/2023 ([facility]) Pathology findings: Right 12:00  (Coil-shaped clip)IDC, poorly differentiated, ER strongly positive in 95%, MI moderately positive in 5%, Her2 negative, and Ki67 60% Right axilla lymph node (hydro-marking clip) containing foci of metastatic carcinoma (receptors not repeated on lymph node tissue) Right 3:00 9ribbon-shaped clip) fibroadenoma Right 7:30 (wing-shaped clip)fibroadenoma  She has been in the [place] for 2 years and used to work for the [employer]. She had two breast reductions in [facility] one in 2001 and the second in 2017.  She is on NAC ddAC recently completed 2/23/24 and will be starting THP soon. She plans to be done with chemotherapy on 5/24/2024.   06/12/2024 Pt now s/p neoadjuvant chemotherapy on 5/24/2024. Her  has a new position and the family will be moving to [facility] next month. She has decided to have her surgical treatment in [facility]. She presents today for follow up with her .  She does SBE.  She has not noticed a change in her breast or a breast lump on left. On right mass has gotten bigger since biopsy. She has not noticed a change in her nipple or nipple area. She has not noticed a change in the skin of the breast. except post bx changes on right She is not experiencing nipple discharge. She is experiencing breast pain. Bilateral  She has noticed a lump or lymph node under the armpit on the right only.  BREAST CANCER RISK FACTORS Menarche: 11 Date of LMP: 02/13/2024 Menopause: pre Grav: 5     Para:  3 (17, 13, 3 yo sons) Age at first live birth: 25 Nursed: yes Hysterectomy: no Oophorectomy: no  OCP: no HRT: no  Last pap/pelvic exam: 12/2023 WNL Related family history: pat GM BCA@ 50 Ashkenazi: no Mastery risk assessment: n/a BRCA testing:  Inv[employer]ae 12/27/2023 (Dr. Tam) BRIP1 VUS and CHEK 2 VUS Bra size:  34 DD  Last mammogram: 12/14 2023                              Location: [facility] Report reviewed.                                 Images reviewed. Results: Birads 5 Extremely dense breast tissue. Right 12:00 asymmetry and distortion in area of palpable concern.    Last ultrasound: 03/04/2024                                 Location: [facility] Report reviewed.                                 Images reviewed.  Results: Birads 6 AT 12:00 in the right breast 1 cm f/n at the site of known malignancy there is a heterogeneous irregular mass measuring 2 x 2 2.2 cm with associated color flow and an associated echogenic marker. this surrounding hardness on elastography.    Last MRI:  06/07/2024                                         Location: [place] Report reviewed. Results: BIRADS: 2 Right 12:00 suspicious enhancement in all 4 quadrants compatible with known multicentric malignancy. Overall intensity of enhancing decreased and less clumped and nodular in appearance. Findings compatible with partial treatment response from neoadjuvant chemotherapy.  Left breast: No evidence of suspicious foci of enhancement.

## 2024-06-24 ENCOUNTER — APPOINTMENT (OUTPATIENT)
Dept: HEMATOLOGY ONCOLOGY | Facility: CLINIC | Age: 44
End: 2024-06-24
Payer: COMMERCIAL

## 2024-06-24 ENCOUNTER — RESULT REVIEW (OUTPATIENT)
Age: 44
End: 2024-06-24

## 2024-06-24 VITALS
DIASTOLIC BLOOD PRESSURE: 50 MMHG | RESPIRATION RATE: 16 BRPM | HEART RATE: 90 BPM | WEIGHT: 119.5 LBS | HEIGHT: 63 IN | BODY MASS INDEX: 21.17 KG/M2 | TEMPERATURE: 97.7 F | SYSTOLIC BLOOD PRESSURE: 100 MMHG | OXYGEN SATURATION: 99 %

## 2024-06-24 DIAGNOSIS — C50.911 MALIGNANT NEOPLASM OF UNSPECIFIED SITE OF RIGHT FEMALE BREAST: ICD-10-CM

## 2024-06-24 DIAGNOSIS — Z79.899 OTHER LONG TERM (CURRENT) DRUG THERAPY: ICD-10-CM

## 2024-06-24 DIAGNOSIS — C50.811 MALIGNANT NEOPLASM OF OVERLAPPING SITES OF RIGHT FEMALE BREAST: ICD-10-CM

## 2024-06-24 DIAGNOSIS — Z17.0 MALIGNANT NEOPLASM OF OVERLAPPING SITES OF RIGHT FEMALE BREAST: ICD-10-CM

## 2024-06-24 DIAGNOSIS — Z51.11 ENCOUNTER FOR ANTINEOPLASTIC CHEMOTHERAPY: ICD-10-CM

## 2024-06-24 DIAGNOSIS — C77.3 MALIGNANT NEOPLASM OF UNSPECIFIED SITE OF RIGHT FEMALE BREAST: ICD-10-CM

## 2024-06-24 PROCEDURE — 36415 COLL VENOUS BLD VENIPUNCTURE: CPT

## 2024-06-24 PROCEDURE — 99215 OFFICE O/P EST HI 40 MIN: CPT

## 2024-06-24 PROCEDURE — G2211 COMPLEX E/M VISIT ADD ON: CPT | Mod: NC

## 2024-06-24 PROCEDURE — 99417 PROLNG OP E/M EACH 15 MIN: CPT

## 2024-06-24 NOTE — HISTORY OF PRESENT ILLNESS
[de-identified] : Arely Luu is a 43 year old female being referred by Dr Schmitz for right breast cancer.   LMP 22 Last mammo 2023 Impression: 1. Bilateral 3-D Screening Mammogram Dense breast parenchyma limits evaluation Highly suspicious irregular mass with spiculation in the 12:00 position of the right breast mid depth with suspicious grouped calcifications noted in area, corresponding to an irregular solid mass highly suspicious mass or breast ultrasound. This correspond to the palpable abnormality that the patient reports. Recommend ultrasound=guided biopsy. BIRADS Category 5 - highly suspicious 2. BILATERAL BREAT ULTRASOUND Palpable right breast 12:00 position mass is seen as irregular taller than wide hypoechoic 1.9 x2.5vm solid mass with microcalcifiations within. Highly suspicious for malignancy. Recommend ultrasound-guided biopsy. Right breast 3:00 retroareolar 0.9x0.7x0.5cm mass. Recommend ultrasound guided biopsy Right breast 7:30 position 11cm from nipple 1.1 x1.2x0.6cm slightly lobulated mass. Recommend ultrasound guided biopsy Right axilla 1.8x1.1x1.9cm prominent lymph node demonstrating a thickened cortex. Recommend ultrasound guided biopsy BI-RADS Category 5- high suspicious, biopsy recommended  CNY; Never Contraception: Withdrawal method Hx Abnormal Pap: denies  G5 3 SABx2,  x3   No new bone pains, weight loss Age at Menarche: 11 years Age at Menopause: LMP 22 Age at first pregnancy: 25 years Total number of pregnancies: 3 children.  Breast feedinm, 12m, 12m OC pills Never  Saw Dr Jelena Schmitz (Breast surgery) and had PET/CT (24)  PET/CT (23) Reviewed and discussed with radiology (Dr Riky Spencer) She has FDG avid right breast mass with multiple lymph node involvement including right axillary, infrapectoral and right supraclavicular node. No distant metastases  She is status post Chemo-Port placement, echocardiogram s/p biopsy of right supraclavicular lymph node 2024 Path: Metastatic carcinoma consistent with breast primary ER>95% strong positive. WA up to 10% moderate to strong positive. Her2: Positive (Score: 3+).  She went into [facility] on 2023 for low grade fever and low back pain - CXR, CT lumbar, and MRI of lumbar were all unremarkable.  She's feeling better since with improved reflux.   Ultrasound (3/4/24): Stable findings [de-identified] : Patient is seen today for follow up and maintenance herceptin and perjeta s/p C4D15 Taxol herceptin perjeta (3/8/24 - 5/24/24) s/p C4 ddAC (1/12/24 - 2/23/24)  She is seen today for follow-up Flying to [facility] to continue rest of her care tomorrow [6/25/2024].  She is scheduled appointment with Dr. Og Huggins (Onco surgeon) on June 28, 2024 and had televisit with Dr. Tam Lainez (Med Onc) Overall feels well C/o neuropathy in R hand. mri of breast done 6/7/2024 IMPRESSION: 1.  Biopsy-proven malignancy at the right 12:00 axis, with suspicious enhancement involving all 4 quadrants, compatible with multicentric disease, measuring up to 10 cm in CC dimension. Overall suspicious enhancement is decreased in enhancement intensity and is significantly less clumped and nodular in appearance, compatible with a partial treatment response. This enhancement again extends to the subareolar region, with associated nipple retraction. 2.  Susceptibility artifact from a marking clip is again seen in the right axilla at the site of biopsy-proven metastatic disease. Previously seen right axillary adenopathy has resolved. 3.  No MRI evidence of malignancy, left breast.  pet done 6/3/2024 IMPRESSION:  Since 1/4/2024, interval decrease in avidity of right breast mass as well as right supraclavicular, subpectoral, and axillary lymphadenopathy, indicating a favorable response to treatment.   She has changed her plans and will now go to [facility]- planning to leave July 2024 She is planning to establish with cancer center at [facility] in [facility] Restaging MRI has been scheduled and PET scan will be scheduled

## 2024-06-24 NOTE — ASSESSMENT
[FreeTextEntry1] : ## Right breast  IDC Grade 3, 2.9 cm per USG ER (95%) positive, TN (5%) positive, [id number] negative by IHC, KI 67- 60% Right Supraclavicular LN: [id number] 3+ Right axillary node: [id number] positive by FISH First felt end of July 2023. Unchanged [employer]ae genetic testing done at [facility], Results pending MRI (12/26/23) at [facility]-: 4.8 cm x 3.1 cm right breast 12:00 mass, extension into medial half of right breast. Right axillary LN marking clip. Left breast normal PET/CT (1/4/23): FDG avid right breast mass with multiple lymph node involvement including right axillary, infrapectoral and right supraclavicular node. No distant metastases Clinical pathological stage IIIB ([id number]) if the supraclavicular node is positive for mets Echo LVEF 62% s/p biopsy of right supraclavicular lymph node 1/9/2024 Path: Metastatic carcinoma consistent with breast primary ER>95% strong positive. TN up to 10% moderate to strong positive. Her2: Positive (Score: 3+). 1/2024 MRI - neg for brain mets.  Status post C4 ddAC (1/12/2024 - 2/23/24)  s/p C4D15 Taxol herceptin perjeta (3/8/24 - 5/24/24)  She is seen today for follow-up and maintenance herceptin and perjeta Overall she has had a great response with the neoadjuvant treatment.  PET scan and MRI shows a partial response I explained to her that we will have to wait for the final surgical pathology.  Unfortunately, due to her social situation she is transitioning her care to [facility]. Explained that she should continue Herceptin and Perjeta every 3 weeks until she undergoes surgery.  She has discussed the option of mastectomy with Dr. Salguero and her surgeon apparently recommended the same.  She prefers to do bilateral mastectomy however her surgeon in [place] is advising her to only go for right mastectomy Explained that depending on her response on the final pathology, we can decide on the next steps.  If she has a pathology complete response I would recommend continuing Herceptin and Perjeta to complete 12 months of treatment.  If she has residual disease, her options would be to switch to Kadcyla or if available she could enroll in clinical trial such as [employer] HER2. However I am not sure if the trial would be available in [place].  -Repeat Echo every 3 months  All the records and information given to patient in the form of print out. Lifestyle changes including exercise [150 minutes of cardio in a week] and diet [preferably plant-based diet, with avoidance of processed food and plastics]  Social hx:  Lives in [place], [place] with   ([place]) Worked [employer] Originally from [place].  Never smoker  Family Hx F GM: breast cancer Inv[facility]ae sent at [facility]. Follow results  Patient had multiple questions which were answered to satisfaction  She will follow-up with me as needed

## 2024-06-24 NOTE — PHYSICAL EXAM
[Fully active, able to carry on all pre-disease performance without restriction] : Status 0 - Fully active, able to carry on all pre-disease performance without restriction [Normal] : affect appropriate [de-identified] : Right breast mass continues to be softer and smaller ~1 cm . Right axillary node barely palpable. Supraclavicular node not palpable [de-identified] : Mild pain around the prior spine surgery site.

## 2024-06-28 ENCOUNTER — APPOINTMENT (OUTPATIENT)
Dept: HEMATOLOGY ONCOLOGY | Facility: CLINIC | Age: 44
End: 2024-06-28